# Patient Record
Sex: MALE | Race: WHITE | NOT HISPANIC OR LATINO | Employment: UNEMPLOYED | URBAN - METROPOLITAN AREA
[De-identification: names, ages, dates, MRNs, and addresses within clinical notes are randomized per-mention and may not be internally consistent; named-entity substitution may affect disease eponyms.]

---

## 2018-12-31 ENCOUNTER — TELEPHONE (OUTPATIENT)
Dept: SCHEDULING | Facility: IMAGING CENTER | Age: 63
End: 2018-12-31

## 2019-01-29 ENCOUNTER — TELEPHONE (OUTPATIENT)
Dept: SCHEDULING | Facility: IMAGING CENTER | Age: 64
End: 2019-01-29

## 2019-01-29 NOTE — PROGRESS NOTES
New Patient     Reason for visit: Acute Illness    CC: med refill    HPI: Patient is a pleasant 63-year-old man who comes in for medication refills.  His prior PCP is no longer in practice.  He is wanting to reestablish with an office that is closer to his home.  This doctor called in sick yesterday, which is why he made an acute visit appointment today for med refills.  He will be rescheduling with a new PCP as soon as possible.    GOUT  -Last gout attack was several years ago.  Has been taking allopurinol since then.  Has only had one attack.    Hypertension  -SBP at home ranges between 130-140  -Denies headaches, shortness of breath, chest pain/palpitations, lower extremity edema.    Hyperlipidemia  -Taking atorvastatin 80 mg.  Not on aspirin.    Social history: Quit smoking tobacco a few years ago.  Does continue to drink alcohol.    Patient Active Problem List    Diagnosis Date Noted   • Gout 01/30/2019   • Essential hypertension 01/30/2019   • Other hyperlipidemia 01/30/2019       No current outpatient prescriptions on file.     No current facility-administered medications for this visit.        Allergies as of 01/30/2019   • (Not on File)       Social History     Social History   • Marital status:      Spouse name: N/A   • Number of children: N/A   • Years of education: N/A     Occupational History   • Not on file.     Social History Main Topics   • Smoking status: Never Smoker   • Smokeless tobacco: Never Used   • Alcohol use 7.2 oz/week     12 Cans of beer per week   • Drug use: Yes     Types: Marijuana   • Sexual activity: Not on file     Other Topics Concern   • Not on file     Social History Narrative   • No narrative on file       History reviewed. No pertinent family history.    History reviewed. No pertinent surgical history.    ROS: As per HPI. Additional pertinent symptoms as noted below.  Review of Systems   Constitutional: Negative for chills, fever and malaise/fatigue.   HENT: Negative for  "hearing loss.    Eyes: Negative for blurred vision.   Respiratory: Negative for cough and shortness of breath.    Cardiovascular: Negative for chest pain, palpitations and leg swelling.   Gastrointestinal: Negative for abdominal pain and nausea.   Genitourinary: Negative for dysuria and frequency.   Musculoskeletal: Negative for joint pain and myalgias.   Skin: Negative for rash.   Neurological: Negative for dizziness, sensory change, focal weakness, weakness and headaches.   Psychiatric/Behavioral: Negative for depression. The patient is not nervous/anxious.      /84 (BP Location: Left arm, Patient Position: Sitting, BP Cuff Size: Adult)   Pulse 86   Temp 36.5 °C (97.7 °F) (Temporal)   Ht 1.721 m (5' 7.75\")   Wt 92.3 kg (203 lb 6.4 oz)   SpO2 94%   BMI 31.16 kg/m²      Physical Exam  General:  Alert and oriented, No apparent distress.  Hoarse voice.    Eyes: Pupils equal and reactive. No scleral icterus.    Throat: Clear no erythema or exudates noted.    Neck: Supple. No lymphadenopathy noted. Thyroid not enlarged.    Lungs: Clear to auscultation and percussion bilaterally.    Cardiovascular: Regular rate and rhythm. No murmurs, rubs or gallops.    Abdomen:  Benign. No rebound or guarding noted.    Extremities: No clubbing, cyanosis, edema.    Skin: Clear. No rash or suspicious skin lesions noted.  Scar on anterior neck.      Assessment and Plan    Gout  -Pending uric acid level  -Refilled allopurinol  -Follow-up with new PCP    Hypertension  -SBP at home 130-140s, in office 140  -On diltiazem and lisinopril  -Ordered CMP  -Refilled meds  -Follow-up with new PCP    Hyperlipidemia  -Refilled atorvastatin  -Pending lipid panel    Healthcare maintenance  Pending CBC, CMP, lipid panel, uric acid.  -Recommend follow-up with new PCP as soon as possible.  -Advised to avoid alcohol      Signed by: Merlyn Neely M.D.  "

## 2019-01-30 ENCOUNTER — OFFICE VISIT (OUTPATIENT)
Dept: INTERNAL MEDICINE | Facility: MEDICAL CENTER | Age: 64
End: 2019-01-30
Payer: COMMERCIAL

## 2019-01-30 VITALS
BODY MASS INDEX: 30.83 KG/M2 | HEIGHT: 68 IN | WEIGHT: 203.4 LBS | SYSTOLIC BLOOD PRESSURE: 140 MMHG | OXYGEN SATURATION: 94 % | HEART RATE: 86 BPM | DIASTOLIC BLOOD PRESSURE: 84 MMHG | TEMPERATURE: 97.7 F

## 2019-01-30 DIAGNOSIS — M10.9 GOUT OF FOOT, UNSPECIFIED CAUSE, UNSPECIFIED CHRONICITY, UNSPECIFIED LATERALITY: ICD-10-CM

## 2019-01-30 DIAGNOSIS — I10 ESSENTIAL HYPERTENSION: ICD-10-CM

## 2019-01-30 DIAGNOSIS — E78.49 OTHER HYPERLIPIDEMIA: ICD-10-CM

## 2019-01-30 PROCEDURE — 99204 OFFICE O/P NEW MOD 45 MIN: CPT | Mod: GC | Performed by: INTERNAL MEDICINE

## 2019-01-30 RX ORDER — DILTIAZEM HYDROCHLORIDE 180 MG/1
180 CAPSULE, COATED, EXTENDED RELEASE ORAL DAILY
Qty: 30 CAP | Refills: 0 | Status: SHIPPED | OUTPATIENT
Start: 2019-01-30 | End: 2019-02-26 | Stop reason: SDUPTHER

## 2019-01-30 RX ORDER — ATORVASTATIN CALCIUM 80 MG/1
80 TABLET, FILM COATED ORAL EVERY EVENING
Qty: 30 TAB | Refills: 0 | Status: SHIPPED | OUTPATIENT
Start: 2019-01-30 | End: 2019-02-26 | Stop reason: SDUPTHER

## 2019-01-30 RX ORDER — LISINOPRIL 40 MG/1
40 TABLET ORAL DAILY
Qty: 30 TAB | Refills: 0 | Status: SHIPPED | OUTPATIENT
Start: 2019-01-30 | End: 2019-02-26 | Stop reason: SDUPTHER

## 2019-01-30 RX ORDER — DILTIAZEM HYDROCHLORIDE 180 MG/1
180 CAPSULE, COATED, EXTENDED RELEASE ORAL DAILY
COMMUNITY
End: 2019-01-30 | Stop reason: SDUPTHER

## 2019-01-30 RX ORDER — ALLOPURINOL 300 MG/1
300 TABLET ORAL DAILY
Qty: 30 TAB | Refills: 0 | Status: SHIPPED | OUTPATIENT
Start: 2019-01-30 | End: 2019-02-26 | Stop reason: SDUPTHER

## 2019-01-30 RX ORDER — LISINOPRIL 40 MG/1
40 TABLET ORAL DAILY
COMMUNITY
Start: 2019-01-19 | End: 2019-01-30 | Stop reason: SDUPTHER

## 2019-01-30 RX ORDER — ATORVASTATIN CALCIUM 80 MG/1
80 TABLET, FILM COATED ORAL EVERY EVENING
COMMUNITY
Start: 2019-01-19 | End: 2019-01-30 | Stop reason: SDUPTHER

## 2019-01-30 RX ORDER — ALLOPURINOL 300 MG/1
300 TABLET ORAL DAILY
COMMUNITY
Start: 2019-01-19 | End: 2019-01-30 | Stop reason: SDUPTHER

## 2019-01-30 ASSESSMENT — ENCOUNTER SYMPTOMS
HEADACHES: 0
MYALGIAS: 0
CHILLS: 0
ABDOMINAL PAIN: 0
DIZZINESS: 0
SENSORY CHANGE: 0
COUGH: 0
NAUSEA: 0
NERVOUS/ANXIOUS: 0
SHORTNESS OF BREATH: 0
PALPITATIONS: 0
DEPRESSION: 0
FEVER: 0
WEAKNESS: 0
FOCAL WEAKNESS: 0
BLURRED VISION: 0

## 2019-02-06 ENCOUNTER — HOSPITAL ENCOUNTER (OUTPATIENT)
Dept: LAB | Facility: MEDICAL CENTER | Age: 64
End: 2019-02-06
Attending: STUDENT IN AN ORGANIZED HEALTH CARE EDUCATION/TRAINING PROGRAM
Payer: COMMERCIAL

## 2019-02-06 DIAGNOSIS — I10 ESSENTIAL HYPERTENSION: ICD-10-CM

## 2019-02-06 DIAGNOSIS — E78.49 OTHER HYPERLIPIDEMIA: ICD-10-CM

## 2019-02-06 DIAGNOSIS — M10.9 GOUT OF FOOT, UNSPECIFIED CAUSE, UNSPECIFIED CHRONICITY, UNSPECIFIED LATERALITY: ICD-10-CM

## 2019-02-06 LAB
BASOPHILS # BLD AUTO: 1.5 % (ref 0–1.8)
BASOPHILS # BLD: 0.13 K/UL (ref 0–0.12)
EOSINOPHIL # BLD AUTO: 0.24 K/UL (ref 0–0.51)
EOSINOPHIL NFR BLD: 2.8 % (ref 0–6.9)
ERYTHROCYTE [DISTWIDTH] IN BLOOD BY AUTOMATED COUNT: 44.3 FL (ref 35.9–50)
HCT VFR BLD AUTO: 46.4 % (ref 42–52)
HGB BLD-MCNC: 15.9 G/DL (ref 14–18)
IMM GRANULOCYTES # BLD AUTO: 0.04 K/UL (ref 0–0.11)
IMM GRANULOCYTES NFR BLD AUTO: 0.5 % (ref 0–0.9)
LYMPHOCYTES # BLD AUTO: 3.04 K/UL (ref 1–4.8)
LYMPHOCYTES NFR BLD: 35.1 % (ref 22–41)
MCH RBC QN AUTO: 32.6 PG (ref 27–33)
MCHC RBC AUTO-ENTMCNC: 34.3 G/DL (ref 33.7–35.3)
MCV RBC AUTO: 95.3 FL (ref 81.4–97.8)
MONOCYTES # BLD AUTO: 1.02 K/UL (ref 0–0.85)
MONOCYTES NFR BLD AUTO: 11.8 % (ref 0–13.4)
NEUTROPHILS # BLD AUTO: 4.18 K/UL (ref 1.82–7.42)
NEUTROPHILS NFR BLD: 48.3 % (ref 44–72)
NRBC # BLD AUTO: 0 K/UL
NRBC BLD-RTO: 0 /100 WBC
PLATELET # BLD AUTO: 196 K/UL (ref 164–446)
PMV BLD AUTO: 10.5 FL (ref 9–12.9)
RBC # BLD AUTO: 4.87 M/UL (ref 4.7–6.1)
WBC # BLD AUTO: 8.7 K/UL (ref 4.8–10.8)

## 2019-02-06 PROCEDURE — 85025 COMPLETE CBC W/AUTO DIFF WBC: CPT

## 2019-02-06 PROCEDURE — 36415 COLL VENOUS BLD VENIPUNCTURE: CPT

## 2019-02-13 ENCOUNTER — OFFICE VISIT (OUTPATIENT)
Dept: MEDICAL GROUP | Facility: PHYSICIAN GROUP | Age: 64
End: 2019-02-13
Payer: COMMERCIAL

## 2019-02-13 VITALS
HEIGHT: 68 IN | OXYGEN SATURATION: 94 % | TEMPERATURE: 98.5 F | DIASTOLIC BLOOD PRESSURE: 68 MMHG | WEIGHT: 204 LBS | BODY MASS INDEX: 30.92 KG/M2 | SYSTOLIC BLOOD PRESSURE: 140 MMHG | HEART RATE: 83 BPM

## 2019-02-13 DIAGNOSIS — Z00.00 HEALTHCARE MAINTENANCE: ICD-10-CM

## 2019-02-13 DIAGNOSIS — E78.49 OTHER HYPERLIPIDEMIA: ICD-10-CM

## 2019-02-13 DIAGNOSIS — M10.9 GOUT OF FOOT, UNSPECIFIED CAUSE, UNSPECIFIED CHRONICITY, UNSPECIFIED LATERALITY: ICD-10-CM

## 2019-02-13 DIAGNOSIS — Z87.19 HISTORY OF COLONIC DIVERTICULITIS: ICD-10-CM

## 2019-02-13 DIAGNOSIS — R73.03 PREDIABETES: ICD-10-CM

## 2019-02-13 DIAGNOSIS — Z13.228 ENCOUNTER FOR SCREENING FOR METABOLIC DISORDER: ICD-10-CM

## 2019-02-13 DIAGNOSIS — R49.0 HOARSENESS OF VOICE: ICD-10-CM

## 2019-02-13 DIAGNOSIS — I10 ESSENTIAL HYPERTENSION: ICD-10-CM

## 2019-02-13 PROCEDURE — 99214 OFFICE O/P EST MOD 30 MIN: CPT | Performed by: INTERNAL MEDICINE

## 2019-02-13 ASSESSMENT — PATIENT HEALTH QUESTIONNAIRE - PHQ9: CLINICAL INTERPRETATION OF PHQ2 SCORE: 0

## 2019-02-13 NOTE — ASSESSMENT & PLAN NOTE
This is a chronic health problem that is uncontrolled with current medications and lifestyle measures. B.P in the office today was 140/68. On Lisinopril 40 mg daily, Diltiazem 180 mg daily. Home B.P checks at home once a week, 132/81.

## 2019-02-13 NOTE — PROGRESS NOTES
CC: Establish care with new PCP, hypertension.    HISTORY OF PRESENT ILLNESS: Patient is a 63 y.o. male established patient who presents today to discuss her medical conditions as mentioned in HPI below.    Health Maintenance: Completed    Squamous cell carcinoma  Hx of Laryngeal SCC at [facility] in 2005. Since then patient has been visiting every 3 months for 5 yrs F/U and don't need it anymore.      Hoarseness of voice  Hx of Laryngeal SCC resection. No Chemo radio Rx and he is free.    Essential hypertension  This is a chronic health problem that is uncontrolled with current medications and lifestyle measures. B.P in the office today was 140/68. On Lisinopril 40 mg daily, Diltiazem 180 mg daily. Home B.P checks at home once a week, 132/81.    Other hyperlipidemia  This is a chronic health problem that is well controlled with current medications and lifestyle measures. On Atorvastatin 80 mg daily.    Gout  This is a chronic health problem that is well controlled with current medications and lifestyle measures. On ALlopurinol 300 mg daily.     Healthcare maintenance  Last Colonoscopy 5 yrs back OK for 10 yrs as per the patient.  Due for Tdap and Shingrix.    History of colonic diverticulitis  Had hospitalization 5 yrs back with bloody stools.       PHQ score 0, BMI within normal limits, no tobacco, no fall injuries.    Patient Active Problem List    Diagnosis Date Noted   • Squamous cell carcinoma 02/13/2019   • Hoarseness of voice 02/13/2019   • Healthcare maintenance 02/13/2019   • History of colonic diverticulitis 02/13/2019   • Gout 01/30/2019   • Essential hypertension 01/30/2019   • Other hyperlipidemia 01/30/2019      Allergies:Patient has no known allergies.    Current Outpatient Prescriptions   Medication Sig Dispense Refill   • allopurinol (ZYLOPRIM) 300 MG Tab Take 1 Tab by mouth every day. 30 Tab 0   • atorvastatin (LIPITOR) 80 MG tablet Take 1 Tab by mouth every evening. 30 Tab 0   • DILTIAZem CD  (CARDIZEM CD) 180 MG CAPSULE SR 24 HR Take 1 Cap by mouth every day. 30 Cap 0   • lisinopril (PRINIVIL, ZESTRIL) 40 MG tablet Take 1 Tab by mouth every day. 30 Tab 0     No current facility-administered medications for this visit.        Social History   Substance Use Topics   • Smoking status: Never Smoker   • Smokeless tobacco: Never Used   • Alcohol use 7.2 oz/week     12 Cans of beer per week     Social History     Social History Narrative   • No narrative on file       Family History   Problem Relation Age of Onset   • Diabetes Mother    • No Known Problems Father         ROS:     - Constitutional:  Negative for fever, chills, unexpected weight change, and fatigue/generalized weakness.    - HEENT:  Negative for headaches, vision changes, hearing changes, ear pain, ear discharge, rhinorrhea, sinus congestion, sore throat, and neck pain.      - Respiratory: Negative for cough, sputum production, chest congestion, dyspnea, wheezing, and crackles.      - Cardiovascular: Negative for chest pain, palpitations, orthopnea, and bilateral lower extremity edema.     - Gastrointestinal: Negative for heartburn, nausea, vomiting, abdominal pain, hematochezia, melena, diarrhea, constipation, and greasy/foul-smelling stools.     - Genitourinary: Negative for dysuria, polyuria, hematuria, pyuria, urinary urgency, and urinary incontinence.     - Musculoskeletal: Negative for myalgias, back pain, and joint pain.     - Skin: Negative for rash, itching, cyanotic skin color change.     - Neurological: Negative for dizziness, tingling, tremors, focal sensory deficit, focal weakness and headaches.     - Endo/Heme/Allergies: Does not bruise/bleed easily.     - Psychiatric/Behavioral: Negative for depression, suicidal/homicidal ideation and memory loss.        Last lab work was only CBC done recently, will do all the lab work needed.    Exam:    Blood pressure 140/68, pulse 83, temperature 36.9 °C (98.5 °F), temperature source Temporal,  "height 1.727 m (5' 8\"), weight 92.5 kg (204 lb), SpO2 94 %. Body mass index is 31.02 kg/m².    General:  Well nourished, well developed male in NAD  Head is grossly normal.  Neck: Supple without JVD or bruit. Thyroid is not enlarged.  Pulmonary: Clear to ausculation and percussion.  Normal effort. No rales, ronchi, or wheezing.  Cardiovascular: Regular rate and rhythm without murmur. Carotid and radial pulses are intact and equal bilaterally.  Extremities: no clubbing, cyanosis, or edema.    Please note that this dictation was created using voice recognition software. I have made every reasonable attempt to correct obvious errors, but I expect that there are errors of grammar and possibly content that I did not discover before finalizing the note.    Assessment/Plan:  1. Squamous cell carcinoma  2. Hoarseness of voice  Stable, will get the records from [facility] where he had underwent the treatment.  Does have continuing hoarseness of voice.  Patient expresses that he is traveling, at this time and requesting for a Z-Elmer for prophylactic usage as he gets attacks of bronchitis with the current medical conditions.    2. Essential hypertension  Borderline elevated, continue current medications lisinopril 40 mg daily, diltiazem 180 mg daily.  Given instructions to check BP log and also DASH diet in the after visit summary today.  Patient wants to come down on the medication dosages and slowly stop them but explained all the risks of uncontrolled blood pressure at this time.  - Comp Metabolic Panel; Future      4. Other hyperlipidemia  No recent lipid panel visible in epic, currently on atorvastatin 80 mg nightly.  Will check his lipid panel and adjust the dose if needed.  - Lipid Profile; Future    5. Gout of foot, unspecified cause, unspecified chronicity, unspecified laterality  Stable, no recent flareup of gout.  Continue current allopurinol 300 mg daily.    6. Prediabetes  Well-controlled, patient has been previously " using metformin in the past given the family history of diabetes in mother.  Requesting for a check of HbA1c at this time.  As he stopped by self managing on the metformin.  - HEMOGLOBIN A1C; Future    9. Encounter for screening for metabolic disorder  Will check a vitamin D level and replete if needed given his age.  - VITAMIN D,25 HYDROXY; Future

## 2019-02-13 NOTE — ASSESSMENT & PLAN NOTE
Hx of Laryngeal SCC at [facility] in 2005. Since then patient has been visiting every 3 months for 5 yrs F/U and don't need it anymore.

## 2019-02-13 NOTE — PATIENT INSTRUCTIONS
"Dietary Approaches to Stop Hypertension trial -- A different approach was evaluated in the Dietary Approaches to Stop Hypertension (DASH) trial [6]. Rather than evaluating sodium intake or weight loss, DASH randomly assigned 459 patients with BPs of less than 160/80 to 95 mmHg to one of three diets:  ?A control diet low in fruits, vegetables, and legumes and high in snacks, sweets, meats, and saturated fat.  ?A diet rich in fruits, vegetables, legumes and low in snacks and sweets.  ?A combination diet rich in fruits, vegetables, legumes, and low-fat dairy products and low in snacks, sweets, meats, and saturated and total fat (this combination diet is called the \"DASH diet\"). The DASH diet is comprised of four to five servings of fruit, four to five servings of vegetables, two to three servings of low-fat dairy per day, and <25 percent fat.  The following observations were noted in which the BP reductions were expressed in relation to the fall in BP seen with the control diet:  ?The fruits and vegetables diet reduced the BP by 2.8/1.1 mmHg, and the combination diet reduced the BP by 5.5/3.0.  ?These effects were more pronounced in patients with hypertension. With the combination diet, for example, the BP fell 11.4/5.5 mmHg in hypertensives versus 3.5/2.1 mmHg in the normotensives.  ?The antihypertensive effects were maximal by the end of week 2 with any of the diets and were then maintained for eight weeks.      "

## 2019-02-13 NOTE — ASSESSMENT & PLAN NOTE
This is a chronic health problem that is well controlled with current medications and lifestyle measures. On ALlopurinol 300 mg daily.

## 2019-02-13 NOTE — ASSESSMENT & PLAN NOTE
This is a chronic health problem that is well controlled with current medications and lifestyle measures. On Atorvastatin 80 mg daily.

## 2019-02-14 ENCOUNTER — HOSPITAL ENCOUNTER (OUTPATIENT)
Dept: LAB | Facility: MEDICAL CENTER | Age: 64
End: 2019-02-14
Attending: INTERNAL MEDICINE
Payer: COMMERCIAL

## 2019-02-14 DIAGNOSIS — I10 ESSENTIAL HYPERTENSION: ICD-10-CM

## 2019-02-14 DIAGNOSIS — E78.49 OTHER HYPERLIPIDEMIA: ICD-10-CM

## 2019-02-14 DIAGNOSIS — Z13.228 ENCOUNTER FOR SCREENING FOR METABOLIC DISORDER: ICD-10-CM

## 2019-02-14 DIAGNOSIS — R73.03 PREDIABETES: ICD-10-CM

## 2019-02-14 LAB
25(OH)D3 SERPL-MCNC: 39 NG/ML (ref 30–100)
ALBUMIN SERPL BCP-MCNC: 4.6 G/DL (ref 3.2–4.9)
ALBUMIN/GLOB SERPL: 1.4 G/DL
ALP SERPL-CCNC: 63 U/L (ref 30–99)
ALT SERPL-CCNC: 33 U/L (ref 2–50)
ANION GAP SERPL CALC-SCNC: 5 MMOL/L (ref 0–11.9)
AST SERPL-CCNC: 24 U/L (ref 12–45)
BILIRUB SERPL-MCNC: 0.7 MG/DL (ref 0.1–1.5)
BUN SERPL-MCNC: 17 MG/DL (ref 8–22)
CALCIUM SERPL-MCNC: 9.9 MG/DL (ref 8.5–10.5)
CHLORIDE SERPL-SCNC: 107 MMOL/L (ref 96–112)
CHOLEST SERPL-MCNC: 161 MG/DL (ref 100–199)
CO2 SERPL-SCNC: 25 MMOL/L (ref 20–33)
CREAT SERPL-MCNC: 0.76 MG/DL (ref 0.5–1.4)
EST. AVERAGE GLUCOSE BLD GHB EST-MCNC: 117 MG/DL
FASTING STATUS PATIENT QL REPORTED: NORMAL
GLOBULIN SER CALC-MCNC: 3.3 G/DL (ref 1.9–3.5)
GLUCOSE SERPL-MCNC: 127 MG/DL (ref 65–99)
HBA1C MFR BLD: 5.7 % (ref 0–5.6)
HDLC SERPL-MCNC: 49 MG/DL
LDLC SERPL CALC-MCNC: 93 MG/DL
POTASSIUM SERPL-SCNC: 4.1 MMOL/L (ref 3.6–5.5)
PROT SERPL-MCNC: 7.9 G/DL (ref 6–8.2)
SODIUM SERPL-SCNC: 137 MMOL/L (ref 135–145)
TRIGL SERPL-MCNC: 93 MG/DL (ref 0–149)

## 2019-02-14 PROCEDURE — 36415 COLL VENOUS BLD VENIPUNCTURE: CPT

## 2019-02-14 PROCEDURE — 82306 VITAMIN D 25 HYDROXY: CPT

## 2019-02-14 PROCEDURE — 83036 HEMOGLOBIN GLYCOSYLATED A1C: CPT

## 2019-02-14 PROCEDURE — 80061 LIPID PANEL: CPT

## 2019-02-14 PROCEDURE — 80053 COMPREHEN METABOLIC PANEL: CPT

## 2019-02-26 ENCOUNTER — TELEPHONE (OUTPATIENT)
Dept: MEDICAL GROUP | Facility: PHYSICIAN GROUP | Age: 64
End: 2019-02-26

## 2019-02-26 DIAGNOSIS — E78.49 OTHER HYPERLIPIDEMIA: ICD-10-CM

## 2019-02-26 DIAGNOSIS — M10.9 GOUT OF FOOT, UNSPECIFIED CAUSE, UNSPECIFIED CHRONICITY, UNSPECIFIED LATERALITY: ICD-10-CM

## 2019-02-26 DIAGNOSIS — J40 BRONCHITIS: ICD-10-CM

## 2019-02-26 DIAGNOSIS — I10 ESSENTIAL HYPERTENSION: ICD-10-CM

## 2019-02-26 RX ORDER — LISINOPRIL 40 MG/1
40 TABLET ORAL DAILY
Qty: 30 TAB | Refills: 0 | Status: SHIPPED | OUTPATIENT
Start: 2019-02-26 | End: 2019-02-27 | Stop reason: SDUPTHER

## 2019-02-26 RX ORDER — ALLOPURINOL 300 MG/1
300 TABLET ORAL DAILY
Qty: 30 TAB | Refills: 0 | Status: SHIPPED | OUTPATIENT
Start: 2019-02-26 | End: 2019-02-27 | Stop reason: SDUPTHER

## 2019-02-26 RX ORDER — DILTIAZEM HYDROCHLORIDE 180 MG/1
180 CAPSULE, COATED, EXTENDED RELEASE ORAL DAILY
Qty: 30 CAP | Refills: 0 | Status: SHIPPED | OUTPATIENT
Start: 2019-02-26 | End: 2019-02-27 | Stop reason: SDUPTHER

## 2019-02-26 RX ORDER — ATORVASTATIN CALCIUM 80 MG/1
80 TABLET, FILM COATED ORAL EVERY EVENING
Qty: 30 TAB | Refills: 0 | Status: SHIPPED | OUTPATIENT
Start: 2019-02-26 | End: 2019-02-27 | Stop reason: SDUPTHER

## 2019-02-26 RX ORDER — AZITHROMYCIN 250 MG/1
TABLET, FILM COATED ORAL
Qty: 6 TAB | Refills: 0 | Status: SHIPPED | OUTPATIENT
Start: 2019-02-26 | End: 2019-05-07

## 2019-02-26 NOTE — TELEPHONE ENCOUNTER
Patient was in here 2/13 and was expecting refills on all of his medications as well as a z pack for bronchitis, but it looks like none of the medications were sent to the pharamacy. Please advise.

## 2019-02-26 NOTE — TELEPHONE ENCOUNTER
1. Caller Name: Bert                                         Call Back Number: [phone] (home)       Patient approves a detailed voicemail message: N\A  Pt called and is requesting a Z-Pack rx. It was discussed at most recent OV but it was not sent. Please advise.

## 2019-02-27 DIAGNOSIS — M10.9 GOUT OF FOOT, UNSPECIFIED CAUSE, UNSPECIFIED CHRONICITY, UNSPECIFIED LATERALITY: ICD-10-CM

## 2019-02-27 DIAGNOSIS — I10 ESSENTIAL HYPERTENSION: ICD-10-CM

## 2019-02-27 DIAGNOSIS — E78.49 OTHER HYPERLIPIDEMIA: ICD-10-CM

## 2019-02-27 RX ORDER — LISINOPRIL 40 MG/1
40 TABLET ORAL DAILY
Qty: 30 TAB | Refills: 0 | Status: SHIPPED | OUTPATIENT
Start: 2019-02-27 | End: 2019-04-05 | Stop reason: SDUPTHER

## 2019-02-27 RX ORDER — DILTIAZEM HYDROCHLORIDE 180 MG/1
180 CAPSULE, COATED, EXTENDED RELEASE ORAL DAILY
Qty: 30 CAP | Refills: 0 | Status: SHIPPED | OUTPATIENT
Start: 2019-02-27 | End: 2019-04-27 | Stop reason: SDUPTHER

## 2019-02-27 RX ORDER — ATORVASTATIN CALCIUM 80 MG/1
80 TABLET, FILM COATED ORAL EVERY EVENING
Qty: 30 TAB | Refills: 0 | Status: SHIPPED | OUTPATIENT
Start: 2019-02-27 | End: 2019-04-05 | Stop reason: SDUPTHER

## 2019-02-27 RX ORDER — ALLOPURINOL 300 MG/1
300 TABLET ORAL DAILY
Qty: 30 TAB | Refills: 0 | Status: SHIPPED | OUTPATIENT
Start: 2019-02-27 | End: 2019-05-31

## 2019-04-05 DIAGNOSIS — E78.49 OTHER HYPERLIPIDEMIA: ICD-10-CM

## 2019-04-05 DIAGNOSIS — I10 ESSENTIAL HYPERTENSION: ICD-10-CM

## 2019-04-05 NOTE — TELEPHONE ENCOUNTER
*PER NEW INS PROTOCOL, NEEDS TO BE DONE FOR 100DAYS SUPPLY*  Was the patient seen in the last year in this department? Yes    Does patient have an active prescription for medications requested? No     Received Request Via: Pharmacy      Pt met protocol?: Yes  LAST OV 02/13/2019    BP Readings from Last 1 Encounters:   02/13/19 140/68     Lab Results   Component Value Date/Time    CHOLSTRLTOT 161 02/14/2019 08:44 AM    LDL 93 02/14/2019 08:44 AM    HDL 49 02/14/2019 08:44 AM    TRIGLYCERIDE 93 02/14/2019 08:44 AM       Lab Results   Component Value Date/Time    SODIUM 137 02/14/2019 08:44 AM    POTASSIUM 4.1 02/14/2019 08:44 AM    CHLORIDE 107 02/14/2019 08:44 AM    CO2 25 02/14/2019 08:44 AM    GLUCOSE 127 (H) 02/14/2019 08:44 AM    BUN 17 02/14/2019 08:44 AM    CREATININE 0.76 02/14/2019 08:44 AM     Lab Results   Component Value Date/Time    ALKPHOSPHAT 63 02/14/2019 08:44 AM    ASTSGOT 24 02/14/2019 08:44 AM    ALTSGPT 33 02/14/2019 08:44 AM    TBILIRUBIN 0.7 02/14/2019 08:44 AM      Lab Results   Component Value Date/Time    CHOLSTRLTOT 161 02/14/2019 08:44 AM    LDL 93 02/14/2019 08:44 AM    HDL 49 02/14/2019 08:44 AM    TRIGLYCERIDE 93 02/14/2019 08:44 AM       Lab Results   Component Value Date/Time    SODIUM 137 02/14/2019 08:44 AM    POTASSIUM 4.1 02/14/2019 08:44 AM    CHLORIDE 107 02/14/2019 08:44 AM    CO2 25 02/14/2019 08:44 AM    GLUCOSE 127 (H) 02/14/2019 08:44 AM    BUN 17 02/14/2019 08:44 AM    CREATININE 0.76 02/14/2019 08:44 AM     Lab Results   Component Value Date/Time    ALKPHOSPHAT 63 02/14/2019 08:44 AM    ASTSGOT 24 02/14/2019 08:44 AM    ALTSGPT 33 02/14/2019 08:44 AM    TBILIRUBIN 0.7 02/14/2019 08:44 AM

## 2019-04-09 RX ORDER — ATORVASTATIN CALCIUM 80 MG/1
80 TABLET, FILM COATED ORAL EVERY EVENING
Qty: 100 TAB | Refills: 3 | Status: SHIPPED
Start: 2019-04-09 | End: 2020-01-06

## 2019-04-09 RX ORDER — LISINOPRIL 40 MG/1
40 TABLET ORAL DAILY
Qty: 100 TAB | Refills: 1 | Status: SHIPPED | OUTPATIENT
Start: 2019-04-09 | End: 2019-11-05

## 2019-04-09 NOTE — TELEPHONE ENCOUNTER
Pt has had OV within the 12 month protocol and lipid panel is current. 6 month supply sent to pharmacy for BP meds and 12 months of chol.   Lab Results   Component Value Date/Time    CHOLSTRLTOT 161 02/14/2019 08:44 AM    LDL 93 02/14/2019 08:44 AM    HDL 49 02/14/2019 08:44 AM    TRIGLYCERIDE 93 02/14/2019 08:44 AM       Lab Results   Component Value Date/Time    SODIUM 137 02/14/2019 08:44 AM    POTASSIUM 4.1 02/14/2019 08:44 AM    CHLORIDE 107 02/14/2019 08:44 AM    CO2 25 02/14/2019 08:44 AM    GLUCOSE 127 (H) 02/14/2019 08:44 AM    BUN 17 02/14/2019 08:44 AM    CREATININE 0.76 02/14/2019 08:44 AM     Lab Results   Component Value Date/Time    ALKPHOSPHAT 63 02/14/2019 08:44 AM    ASTSGOT 24 02/14/2019 08:44 AM    ALTSGPT 33 02/14/2019 08:44 AM    TBILIRUBIN 0.7 02/14/2019 08:44 AM

## 2019-04-18 ENCOUNTER — PATIENT OUTREACH (OUTPATIENT)
Dept: HEALTH INFORMATION MANAGEMENT | Facility: OTHER | Age: 64
End: 2019-04-18

## 2019-04-27 DIAGNOSIS — I10 ESSENTIAL HYPERTENSION: ICD-10-CM

## 2019-04-29 RX ORDER — DILTIAZEM HYDROCHLORIDE 180 MG/1
CAPSULE, EXTENDED RELEASE ORAL
Qty: 90 CAP | Refills: 0 | Status: SHIPPED | OUTPATIENT
Start: 2019-04-29 | End: 2019-08-04 | Stop reason: SDUPTHER

## 2019-05-06 ENCOUNTER — TELEPHONE (OUTPATIENT)
Dept: MEDICAL GROUP | Facility: PHYSICIAN GROUP | Age: 64
End: 2019-05-06

## 2019-05-06 NOTE — TELEPHONE ENCOUNTER
Future Appointments       Provider Department Center    5/7/2019 10:20 AM Meliza Massey M.D. [facility]        ESTABLISHED PATIENT PRE-VISIT PLANNING     Patient was NOT contacted to complete PVP.     Note: Patient will not be contacted if there is no indication to call.     1.  Reviewed notes from the last few office visits within the medical group: Yes    2.  If any orders were placed at last visit or intended to be done for this visit (i.e. 6 mos follow-up), do we have Results/Consult Notes?        •  Labs - Labs ordered, completed on 02/14/2019 and results are in chart.   Note: If patient appointment is for lab review and patient did not complete labs, check with provider if OK to reschedule patient until labs completed.       •  Imaging - Imaging was not ordered at last office visit.       •  Referrals - No referrals were ordered at last office visit.    3. Is this appointment scheduled as a Hospital Follow-Up? No    4.  Immunizations were updated in [facility] using WebIZ?: Yes       •  Web Iz Recommendations: PREVNAR (PCV13) , TDAP, VARICELLA (Chicken Pox)  and SHINGRIX (Shingles)    5.  Patient is due for the following Health Maintenance Topics:   Health Maintenance Due   Topic Date Due   • IMM DTaP/Tdap/Td Vaccine (1 - Tdap) 04/06/1973   • COLONOSCOPY  04/17/2009   • IMM ZOSTER VACCINES (2 of 3) 11/24/2016   • IMM PNEUMOCOCCAL  (1 of 2 - PCV13) 04/06/2019       6. Orders for overdue Health Maintenance topics pended in Pre-Charting? YES    7.  AHA (MDX) form printed for Provider? YES    8.  Patient was informed to arrive 15 min prior to their scheduled appointment and bring in their medication bottles. confirmed by

## 2019-05-07 ENCOUNTER — OFFICE VISIT (OUTPATIENT)
Dept: MEDICAL GROUP | Facility: PHYSICIAN GROUP | Age: 64
End: 2019-05-07
Payer: MEDICARE

## 2019-05-07 VITALS
DIASTOLIC BLOOD PRESSURE: 56 MMHG | BODY MASS INDEX: 30.77 KG/M2 | HEIGHT: 68 IN | HEART RATE: 84 BPM | TEMPERATURE: 98.9 F | OXYGEN SATURATION: 95 % | WEIGHT: 203 LBS | SYSTOLIC BLOOD PRESSURE: 118 MMHG

## 2019-05-07 DIAGNOSIS — E66.9 OBESITY (BMI 30-39.9): ICD-10-CM

## 2019-05-07 DIAGNOSIS — Z23 NEED FOR VACCINATION: ICD-10-CM

## 2019-05-07 DIAGNOSIS — F10.20 CHRONIC ALCOHOLISM (HCC): ICD-10-CM

## 2019-05-07 DIAGNOSIS — E78.49 OTHER HYPERLIPIDEMIA: ICD-10-CM

## 2019-05-07 DIAGNOSIS — Z12.11 SCREENING FOR COLON CANCER: ICD-10-CM

## 2019-05-07 DIAGNOSIS — E66.9 CLASS 1 OBESITY WITH BODY MASS INDEX (BMI) OF 30.0 TO 30.9 IN ADULT, UNSPECIFIED OBESITY TYPE, UNSPECIFIED WHETHER SERIOUS COMORBIDITY PRESENT: ICD-10-CM

## 2019-05-07 DIAGNOSIS — M10.9 GOUT OF FOOT, UNSPECIFIED CAUSE, UNSPECIFIED CHRONICITY, UNSPECIFIED LATERALITY: ICD-10-CM

## 2019-05-07 DIAGNOSIS — Z71.84 ENCOUNTER FOR COUNSELING FOR TRAVEL: ICD-10-CM

## 2019-05-07 DIAGNOSIS — I10 ESSENTIAL HYPERTENSION: ICD-10-CM

## 2019-05-07 PROCEDURE — 99214 OFFICE O/P EST MOD 30 MIN: CPT | Mod: 25 | Performed by: INTERNAL MEDICINE

## 2019-05-07 PROCEDURE — 8041 PR SCP AHA: Performed by: INTERNAL MEDICINE

## 2019-05-07 PROCEDURE — G0009 ADMIN PNEUMOCOCCAL VACCINE: HCPCS | Performed by: INTERNAL MEDICINE

## 2019-05-07 PROCEDURE — 90715 TDAP VACCINE 7 YRS/> IM: CPT | Performed by: INTERNAL MEDICINE

## 2019-05-07 PROCEDURE — 90472 IMMUNIZATION ADMIN EACH ADD: CPT | Performed by: INTERNAL MEDICINE

## 2019-05-07 PROCEDURE — 90670 PCV13 VACCINE IM: CPT | Performed by: INTERNAL MEDICINE

## 2019-05-07 NOTE — ASSESSMENT & PLAN NOTE
This is a chronic health problem that is well controlled with current medications and lifestyle measures.  Currently on allopurinol 300 mg daily.

## 2019-05-07 NOTE — ASSESSMENT & PLAN NOTE
This is a chronic health problem that is well controlled with current medications and lifestyle measures.  Last lipid panel in February 2019 within normal limits, currently on atorvastatin 80 mg nightly.

## 2019-05-07 NOTE — ASSESSMENT & PLAN NOTE
This is a chronic health problem that is well controlled with current medications and lifestyle measures.  Blood pressure in office today is 1 one 8/56, currently on lisinopril 40 mg daily, diltiazem 180 mg daily.

## 2019-05-07 NOTE — PATIENT INSTRUCTIONS
Today, your Healthcare Provider may have discussed the following recommendations:    1. Exercise and Physical Activity  According to the American Heart Association, it is recommended to engage in physical activity regularly and to aim for 150 minutes of moderate-intensity aerobic activity per week.  Your Healthcare Provider may have recommended taking the stairs instead of the elevator, starting or maintaining a walking program or strength-training program.    2. Emotional Well-being  Mental and emotional well-being is essential to overall health.  Your Healthcare Provider may have encouraged you to build strong, positive relationships with family and friends, become more involved in your community (by volunteering or joining a spiritual community), or focus on self-care.    3. Fall and Injury Prevention  To prevent falls and injuries and also improve your balance, your Healthcare Provider may have suggested that you use a cane or walker, start an exercise of physical therapy program, or have your vision and/or hearing tested.    4. Urinary Leakage (Urinary Incontinence)  To control or manage the leakage of urine, your Healthcare Provider may have recommended you start bladder training exercises (such as Kegel exercises), a trial of a medication or a referral to see a specialist to discuss surgical options.    ==========================    American Diabetes Association (ADA) nutritional guidelines --   1. A diet that includes carbohydrates from fruits, vegetables, whole grains, legumes, and low-fat milk is encouraged.The use of lower glycemic index and glycemic load meals may provide a modest additional benefit for glycemic control.  Low-Glycemic Foods  1. Sweet potatoes  2. Many vegetables, including leafy greens, asparagus, cauliflower  3. Steel-cut oatmeal  4. Farrow  5. Quinoa  6. Legumes, including lentils, chickpeas  7. Connor bread  8. Skim milk  9. Reduced-fat yogurt  10. Sesame seeds, peanuts, flax  seeds  2. A variety of eating patterns (Mediterranean, low fat, low carbohydrate, vegetarian) are acceptable.  3. Fat quality is more important than fat quantity-  with monounsaturated and polyunsaturated fatty acids (eg, those found in fish, olive oil, nuts).   4. Protein intake goals should be individualized but not lower than 0.8 g/kg body weight per day (the recommended daily allowance). Patients should be encouraged to substitute lean meats, fish, eggs, beans, peas, soy products, and nuts and seeds for red meat.  5. Fiber intake should be at least 14 grams per 1000 calories daily; higher fiber intake may improve glycemic control.  6. A reduced sodium intake of 2300 mg per day with a diet high in fruits, vegetables, and low-fat dairy products is prudent. For individuals with hypertension, further reduction in sodium may be necessary.  7. Foods containing sucrose to be avoided.  Physical Activity - 30 minutes or more of moderate-intensity physical activity on most days of the week .    Meliza Massey M.D.

## 2019-05-07 NOTE — PROGRESS NOTES
Subjective:     Bert Villalpando is a 64 y.o. male here today for follow-up visit, hypertension and Annual Health Assessment.    Essential hypertension  This is a chronic health problem that is well controlled with current medications and lifestyle measures.  Blood pressure in office today is 1 one 8/56, currently on lisinopril 40 mg daily, diltiazem 180 mg daily.    Gout  This is a chronic health problem that is well controlled with current medications and lifestyle measures.  Currently on allopurinol 300 mg daily.    Other hyperlipidemia  This is a chronic health problem that is well controlled with current medications and lifestyle measures.  Last lipid panel in February 2019 within normal limits, currently on atorvastatin 80 mg nightly.    Chronic alcoholism (HCC)  This is a chronic health problem that is uncontrolled with current medications and lifestyle measures. Pt currently on 2 cans / beer everyday.    PHQ score 0, BMI within normal limits, no tobacco, no fall injuries    Health Maintenance Summary                IMM DTaP/Tdap/Td Vaccine Overdue 4/6/1973     COLONOSCOPY Overdue 4/17/2009      Done 4/17/2006 REFERRAL TO GI FOR COLONOSCOPY    IMM ZOSTER VACCINES Overdue 11/24/2016      Done 9/29/2016 Imm Admin: Zoster Vaccine Live (ZVL) (Zostavax)    IMM PNEUMOCOCCAL 65+ (ADULT) LOW/MEDIUM RISK SERIES Overdue 4/6/2019      Done 1/7/2014 Imm Admin: Pneumococcal polysaccharide vaccine (PPSV-23)           Annual Health Assessment Questions:     1.  Are you currently engaging in any exercise or physical activity? Yes    2.  How would you describe your mood or emotional well-being today? good    3.  Have you had any falls in the last year? No    4.  Have you noticed any problems with your balance or had difficulty walking? No    5.  In the last six months have you experienced any leakage of urine? No    6. DPA/Advanced Directive: Patient has Advanced Directive on file.     Current medicines (including changes  "today)  Current Outpatient Prescriptions   Medication Sig Dispense Refill   • CARTIA  MG CAPSULE SR 24 HR TAKE ONE CAPSULE BY MOUTH DAILY 90 Cap 0   • lisinopril (PRINIVIL, ZESTRIL) 40 MG tablet Take 1 Tab by mouth every day. 100 Tab 1   • atorvastatin (LIPITOR) 80 MG tablet Take 1 Tab by mouth every evening. 100 Tab 3   • allopurinol (ZYLOPRIM) 300 MG Tab Take 1 Tab by mouth every day. 30 Tab 0     No current facility-administered medications for this visit.        He  has a past medical history of Squamous cell cancer of hypopharynx (HCC).    Patient has no known allergies.    He  reports that he has never smoked. He has never used smokeless tobacco. He reports that he drinks about 7.2 oz of alcohol per week . He reports that he uses drugs, including Marijuana.  Counseling given: Not Answered    Constitutional: Denies fevers or chills  Eyes: Denies changes in vision  Ears/Nose/Throat/Mouth: Denies nasal congestion or sore throat   Cardiovascular: Denies chest pain or palpitations   Respiratory: Denies shortness of breath , Denies cough  Gastrointestinal/Hepatic: Denies abd pain, nausea, vomiting   Genitourinary: Denies dysuria or frequency  Musculoskeletal/Rheum: Denies joint pain and swelling   Neurological: Denies headache  Psychiatric: Denies mood disorder   Endocrine: Denies hx of diabetes or thyroid dysfunction  Heme/Oncology/Lymph Nodes: Denies weight changes or enlarged LNs.   Allergic/Immunologic: Denies hx of allergies      Objective:     Physical Exam:  /56 (BP Location: Right arm, Patient Position: Sitting, BP Cuff Size: Large adult)   Pulse 84   Temp 37.2 °C (98.9 °F) (Temporal)   Ht 1.727 m (5' 8\")   Wt 92.1 kg (203 lb)   SpO2 95%  Body mass index is 30.87 kg/m².   Constitutional: Alert, no distress.  Skin: Warm, dry, good turgor, no rashes in visible areas.  Eye: Equal, round and reactive, conjunctiva clear, lids normal.  ENMT: Lips without lesions, good dentition, oropharynx " clear.  Neck: Trachea midline, no masses, no thyromegaly. No cervical or supraclavicular lymphadenopathy.  Respiratory: Unlabored respiratory effort, lungs clear to auscultation, no wheezes, no rhonchi.  Cardiovascular: Normal S1, S2, no murmur, no edema.  Abdomen: Soft, non-tender, no masses, no hepatosplenomegaly.  Psych: Alert and oriented x3, normal affect and mood.    Assessment and Plan:     1. Essential hypertension  Well-controlled, continue current medications lisinopril 40 mg daily, diltiazem 180 mg daily.    2. Chronic alcoholism (HCC)  Uncontrolled, patient thinks that he is taking only little amount of alcohol.  Understands all the risks.  Discussed at length on all the complications of alcoholism including ataxic gait and fall injuries which he refuses at this time.    3. Need for vaccination  - Tdap Vaccine =>6YO IM  - Pneumococcal Conjugate Vaccine 13-Valent    4. Screening for colon cancer  - REFERRAL TO GI FOR COLONOSCOPY    5. Gout of foot, unspecified cause, unspecified chronicity, unspecified laterality  Well-controlled, continue current allopurinol 300 mg daily.    6. Other hyperlipidemia  Stable, continue current atorvastatin 80 mg nightly.    7. Encounter for counseling for travel  Patient planning for traveling to [place], next month and also upcoming cruise.  Will give referral to clinic for possible need of coverage of vaccinations which he needs.  - REFERRAL TO TRAVEL CLINIC    8. Obesity (BMI 30-39.9)  9. Class 1 obesity with body mass index (BMI) of 30.0 to 30.9 in adult, unspecified obesity type, unspecified whether serious comorbidity present  Pt educated on the increase of morbidity and mortality associated with excess weight including DM, Heart Disease, HTN, stroke, and sleep apnea.  Pt advised weight loss of 5% through reduced calorie, low fat diet and 150 mins of exercise a week  Recommend obesity clinic if patient is unsuccessful with weight loss  - Patient identified as  having weight management issue.  Appropriate orders and counseling given.    Discussion today about general wellness and lifestyle habits:    · Engage in regular physical activity and social activities.  · Prevent falls and reduce trip hazards; using ambulatory aides, hearing and vision testing if appropriate.  · Steps to improve urinary incontinence.  · Advanced care planning.    Follow-Up: Return in about 4 months (around 9/7/2019), or if symptoms worsen or fail to improve.         PLEASE NOTE: This dictation was created using voice recognition software. I have made every reasonable attempt to correct obvious errors, but I expect that there are errors of grammar and possibly content that I did not discover before finalizing the note.

## 2019-05-07 NOTE — ASSESSMENT & PLAN NOTE
This is a chronic health problem that is uncontrolled with current medications and lifestyle measures. Pt currently on 2 cans / beer everyday.

## 2019-05-30 DIAGNOSIS — M10.9 GOUT OF FOOT, UNSPECIFIED CAUSE, UNSPECIFIED CHRONICITY, UNSPECIFIED LATERALITY: ICD-10-CM

## 2019-05-30 NOTE — TELEPHONE ENCOUNTER
Was the patient seen in the last year in this department? Yes    Does patient have an active prescription for medications requested? No     Received Request Via: Pharmacy      Pt met protocol?: Yes   Pt last ov 5/2019    Lab Results   Component Value Date/Time    SODIUM 137 02/14/2019 08:44 AM    POTASSIUM 4.1 02/14/2019 08:44 AM    CHLORIDE 107 02/14/2019 08:44 AM    CO2 25 02/14/2019 08:44 AM    GLUCOSE 127 (H) 02/14/2019 08:44 AM    BUN 17 02/14/2019 08:44 AM    CREATININE 0.76 02/14/2019 08:44 AM

## 2019-05-31 RX ORDER — ALLOPURINOL 300 MG/1
TABLET ORAL
Qty: 90 TAB | Refills: 0 | Status: SHIPPED | OUTPATIENT
Start: 2019-05-31 | End: 2019-09-12 | Stop reason: SDUPTHER

## 2019-08-04 DIAGNOSIS — I10 ESSENTIAL HYPERTENSION: ICD-10-CM

## 2019-08-05 RX ORDER — DILTIAZEM HYDROCHLORIDE 180 MG/1
CAPSULE, EXTENDED RELEASE ORAL
Qty: 90 CAP | Refills: 1 | Status: SHIPPED | OUTPATIENT
Start: 2019-08-05 | End: 2020-02-11 | Stop reason: SDUPTHER

## 2019-08-05 NOTE — TELEPHONE ENCOUNTER
Refill X 6 months, sent to pharmacy.Pt. Seen in the last 6 months per protocol.   Lab Results   Component Value Date/Time    SODIUM 137 02/14/2019 08:44 AM    POTASSIUM 4.1 02/14/2019 08:44 AM    CHLORIDE 107 02/14/2019 08:44 AM    CO2 25 02/14/2019 08:44 AM    GLUCOSE 127 (H) 02/14/2019 08:44 AM    BUN 17 02/14/2019 08:44 AM    CREATININE 0.76 02/14/2019 08:44 AM

## 2019-08-05 NOTE — TELEPHONE ENCOUNTER
Was the patient seen in the last year in this department? Yes    Does patient have an active prescription for medications requested? No     Received Request Via: Pharmacy      Pt met protocol?: Yes    LAST OV 05/07/2019    BP Readings from Last 1 Encounters:   05/07/19 118/56     Lab Results   Component Value Date/Time    CHOLSTRLTOT 161 02/14/2019 08:44 AM    LDL 93 02/14/2019 08:44 AM    HDL 49 02/14/2019 08:44 AM    TRIGLYCERIDE 93 02/14/2019 08:44 AM       Lab Results   Component Value Date/Time    SODIUM 137 02/14/2019 08:44 AM    POTASSIUM 4.1 02/14/2019 08:44 AM    CHLORIDE 107 02/14/2019 08:44 AM    CO2 25 02/14/2019 08:44 AM    GLUCOSE 127 (H) 02/14/2019 08:44 AM    BUN 17 02/14/2019 08:44 AM    CREATININE 0.76 02/14/2019 08:44 AM     Lab Results   Component Value Date/Time    ALKPHOSPHAT 63 02/14/2019 08:44 AM    ASTSGOT 24 02/14/2019 08:44 AM    ALTSGPT 33 02/14/2019 08:44 AM    TBILIRUBIN 0.7 02/14/2019 08:44 AM

## 2019-08-08 NOTE — PROGRESS NOTES
Outcome:Declined; will discuss with PCP at next appt.     Please transfer to Patient Outreach Team at [phone] when patient returns call.    WebIZ Checked & Epic Updated:  no    HealthConnect Verified: yes    Attempt # 1             Visit date not found Last office visit  Wt Readings from Last 1 Encounters:   10/03/18 95.7 kg        BP Readings from Last 2 Encounters:   10/03/18 138/80   09/24/18 128/78   ]    Lab Results   Component Value Date    SODIUM 137 09/04/2018    POTASSIUM 4.3 09/04/2018    CHLORIDE 102 09/04/2018    CO2 26 09/04/2018    BUN 25 (H) 09/04/2018    CREATININE 1.03 09/04/2018    GLUCOSE 173 (H) 09/04/2018     Hemoglobin A1C (%)   Date Value   10/03/2018 6.0 (H)     No results found for: TSH  Lab Results   Component Value Date    CHOLESTEROL 159 02/16/2017    HDL 59 (L) 02/16/2017    CALCLDL 80 02/16/2017    TRIGLYCERIDE 101 02/16/2017     Lab Results   Component Value Date    AST 25 09/04/2018    GPT 34 09/04/2018    GGTP 34 02/16/2017    ALKPT 57 09/04/2018    BILIRUBIN 0.3 09/04/2018     BMP ordered  LVM for patient to please complete labs and schedule F/U  Routed to Provider

## 2019-09-06 DIAGNOSIS — M10.9 GOUT OF FOOT, UNSPECIFIED CAUSE, UNSPECIFIED CHRONICITY, UNSPECIFIED LATERALITY: ICD-10-CM

## 2019-09-09 RX ORDER — ALLOPURINOL 300 MG/1
TABLET ORAL
Refills: 0 | OUTPATIENT
Start: 2019-09-09

## 2019-09-09 NOTE — TELEPHONE ENCOUNTER
Was the patient seen in the last year in this department? Yes    Does patient have an active prescription for medications requested? No     Received Request Via: Pharmacy      Pt met protocol?: Yes    OV 5/19     Lab Results   Component Value Date/Time    CHOLSTRLTOT 161 02/14/2019 0844    TRIGLYCERIDE 93 02/14/2019 0844    HDL 49 02/14/2019 0844    LDL 93 02/14/2019 0844

## 2019-09-12 DIAGNOSIS — M10.9 GOUT OF FOOT, UNSPECIFIED CAUSE, UNSPECIFIED CHRONICITY, UNSPECIFIED LATERALITY: ICD-10-CM

## 2019-09-13 RX ORDER — ALLOPURINOL 300 MG/1
TABLET ORAL
Qty: 90 TAB | Refills: 0 | Status: SHIPPED | OUTPATIENT
Start: 2019-09-13 | End: 2019-12-23 | Stop reason: SDUPTHER

## 2019-09-13 NOTE — TELEPHONE ENCOUNTER
*SCPLS*  Was the patient seen in the last year in this department? Yes    Does patient have an active prescription for medications requested? No     Received Request Via: Pharmacy      Pt met protocol?: Yes    LAST OV 05/07/2019    No results found for: URICACID

## 2019-11-05 ENCOUNTER — OFFICE VISIT (OUTPATIENT)
Dept: MEDICAL GROUP | Facility: PHYSICIAN GROUP | Age: 64
End: 2019-11-05
Payer: MEDICARE

## 2019-11-05 VITALS
HEART RATE: 76 BPM | WEIGHT: 199.8 LBS | HEIGHT: 70 IN | TEMPERATURE: 98.5 F | DIASTOLIC BLOOD PRESSURE: 74 MMHG | BODY MASS INDEX: 28.6 KG/M2 | OXYGEN SATURATION: 94 % | SYSTOLIC BLOOD PRESSURE: 156 MMHG

## 2019-11-05 DIAGNOSIS — I10 ESSENTIAL HYPERTENSION: ICD-10-CM

## 2019-11-05 DIAGNOSIS — J40 BRONCHITIS: ICD-10-CM

## 2019-11-05 DIAGNOSIS — Z23 NEED FOR VACCINATION: ICD-10-CM

## 2019-11-05 DIAGNOSIS — M10.9 GOUT OF FOOT, UNSPECIFIED CAUSE, UNSPECIFIED CHRONICITY, UNSPECIFIED LATERALITY: ICD-10-CM

## 2019-11-05 DIAGNOSIS — E78.49 OTHER HYPERLIPIDEMIA: ICD-10-CM

## 2019-11-05 PROCEDURE — 99214 OFFICE O/P EST MOD 30 MIN: CPT | Performed by: INTERNAL MEDICINE

## 2019-11-05 RX ORDER — LISINOPRIL 40 MG/1
60 TABLET ORAL DAILY
Qty: 150 TAB | Refills: 1 | Status: SHIPPED | OUTPATIENT
Start: 2019-11-05 | End: 2020-02-11 | Stop reason: SDUPTHER

## 2019-11-05 RX ORDER — AZITHROMYCIN 250 MG/1
TABLET, FILM COATED ORAL
Qty: 6 TAB | Refills: 0 | Status: SHIPPED | OUTPATIENT
Start: 2019-11-05 | End: 2020-01-06 | Stop reason: SDUPTHER

## 2019-11-05 NOTE — ASSESSMENT & PLAN NOTE
This is a chronic health problem that is well controlled with current medications and lifestyle measures.Pt has on and off flare ups for this when he visits [facility].

## 2019-11-05 NOTE — ASSESSMENT & PLAN NOTE
This is a chronic health problem that is uncontrolled with current medications and lifestyle measures.  Blood pressure in the office today is 156/74, patient currently on lisinopril 40 mg daily, Cartia 180 mg daily.

## 2019-11-05 NOTE — ASSESSMENT & PLAN NOTE
This is a chronic health problem that is well controlled with current medications and lifestyle measures.  Last lipid panel in April 2019 within normal limits, patient currently on atorvastatin 80 mg nightly.

## 2019-11-05 NOTE — PROGRESS NOTES
CC: Follow-up visit, patient requesting for prophylactic Z-Elmer for bronchitis flareup as he is traveling.    HISTORY OF PRESENT ILLNESS: Patient is a 64 y.o. male established patient who presents today to     Health Maintenance: Due for flu vaccine which he has gotten at [employer] this season, not due for colonoscopy till 2021 as a referral placed last time has given him the letter.    Essential hypertension  This is a chronic health problem that is uncontrolled with current medications and lifestyle measures.  Blood pressure in the office today is 156/74, patient currently on lisinopril 40 mg daily, Cartia 180 mg daily.    Other hyperlipidemia  This is a chronic health problem that is well controlled with current medications and lifestyle measures.  Last lipid panel in April 2019 within normal limits, patient currently on atorvastatin 80 mg nightly.    Gout  This is a chronic health problem that is well controlled with current medications and lifestyle measures.  Currently on allopurinol 300 mg daily.    Bronchitis  This is a chronic health problem that is well controlled with current medications and lifestyle measures.Pt has on and off flare ups for this when he visits [employer] travelling.      PHQ score 0, BMI within normal limits, no tobacco, no fall injuries.    Patient Active Problem List    Diagnosis Date Noted   • Chronic alcoholism (HCC) 05/07/2019   • Obesity (BMI 30-39.9) 05/07/2019   • Bronchitis 02/26/2019   • Squamous cell carcinoma 02/13/2019   • Hoarseness of voice 02/13/2019   • Healthcare maintenance 02/13/2019   • History of colonic diverticulitis 02/13/2019   • Gout 01/30/2019   • Essential hypertension 01/30/2019   • Other hyperlipidemia 01/30/2019   • Tubular adenoma of colon 11/17/2015      Allergies:Patient has no known allergies.    Current Outpatient Medications   Medication Sig Dispense Refill   • lisinopril (PRINIVIL) 40 MG tablet Take 1.5 Tabs by mouth every day. 150 Tab 1   •  azithromycin (ZITHROMAX) 250 MG Tab Take 2 tabs on day 1 and remaining 4 days once a day. 6 Tab 0   • NON SPECIFIED Please give SHINGRIX vaccine 1 Each 1   • allopurinol (ZYLOPRIM) 300 MG Tab TAKE ONE TABLET BY MOUTH DAILY 90 Tab 0   • CARTIA  MG CAPSULE SR 24 HR TAKE ONE CAPSULE BY MOUTH DAILY 90 Cap 1   • atorvastatin (LIPITOR) 80 MG tablet Take 1 Tab by mouth every evening. 100 Tab 3     No current facility-administered medications for this visit.        Social History     Tobacco Use   • Smoking status: Never Smoker   • Smokeless tobacco: Never Used   Substance Use Topics   • Alcohol use: Yes     Alcohol/week: 7.2 oz     Types: 12 Cans of beer per week   • Drug use: Yes     Types: Marijuana     Social History     Patient does not qualify to have social determinant information on file (likely too young).   Social History Narrative   • Not on file       Family History   Problem Relation Age of Onset   • Diabetes Mother    • No Known Problems Father         ROS:     - Constitutional:  Negative for fever, chills, unexpected weight change, and fatigue/generalized weakness.    - HEENT:  Negative for headaches, vision changes, hearing changes, ear pain, ear discharge, rhinorrhea, sinus congestion, sore throat, and neck pain.      - Respiratory: Negative for cough, sputum production, chest congestion, dyspnea, wheezing, and crackles.      - Cardiovascular: Negative for chest pain, palpitations, orthopnea, and bilateral lower extremity edema.     - Gastrointestinal: Negative for heartburn, nausea, vomiting, abdominal pain, hematochezia, melena, diarrhea, constipation, and greasy/foul-smelling stools.     - Genitourinary: Negative for dysuria, polyuria, hematuria, pyuria, urinary urgency, and urinary incontinence.     - Musculoskeletal: Negative for myalgias, back pain, and joint pain.     - Skin: Negative for rash, itching, cyanotic skin color change.     - Neurological: Negative for dizziness, tingling, tremors,  "focal sensory deficit, focal weakness and headaches.     - Endo/Heme/Allergies: Does not bruise/bleed easily.     - Psychiatric/Behavioral: Negative for depression, suicidal/homicidal ideation and memory loss.    Last lab work in April 2019 reviewed and discussed with patient.    Exam:    /74 (BP Location: Left arm, Patient Position: Sitting, BP Cuff Size: Adult)   Pulse 76   Temp 36.9 °C (98.5 °F) (Temporal)   Ht 1.778 m (5' 10\")   Wt 90.6 kg (199 lb 12.8 oz)   SpO2 94%  Body mass index is 28.67 kg/m².    General:  Well nourished, well developed male in NAD  Head is grossly normal.  Neck: Supple without JVD or bruit. Thyroid is not enlarged.  Pulmonary: Clear to ausculation and percussion.  Normal effort. No rales, ronchi, or wheezing.  Cardiovascular: Regular rate and rhythm without murmur. Carotid and radial pulses are intact and equal bilaterally.  Extremities: no clubbing, cyanosis, or edema.    Please note that this dictation was created using voice recognition software. I have made every reasonable attempt to correct obvious errors, but I expect that there are errors of grammar and possibly content that I did not discover before finalizing the note.    Assessment/Plan:  1. Essential hypertension  Uncontrolled, will increase the dose of his lisinopril to 60 mg daily.  Given instructions to get me the BP log for next 5 days on this dosage adjustments, to continue current diltiazem.  Also offered him for change of lisinopril to losartan which is having a better prognosis when patients with gout but patient would like to stick with lisinopril at this time as he feels that his gout is not having any flareups at this time given his current allopurinol.  All the risks understood.  - lisinopril (PRINIVIL) 40 MG tablet; Take 1.5 Tabs by mouth every day.  Dispense: 150 Tab; Refill: 1    2. Other hyperlipidemia  Well-controlled, continue current atorvastatin.    3. Gout of foot, unspecified cause, unspecified " chronicity, unspecified laterality  Well-controlled, continue current allopurinol.    4. Bronchitis  Not active at this time, patient requesting for prophylactic Z-Elmer which is reasonable as he is traveling and requesting for one.  - azithromycin (ZITHROMAX) 250 MG Tab; Take 2 tabs on day 1 and remaining 4 days once a day.  Dispense: 6 Tab; Refill: 0    5. Need for vaccination  - NON SPECIFIED; Please give SHINGRIX vaccine  Dispense: 1 Each; Refill: 1

## 2019-12-20 DIAGNOSIS — M10.9 GOUT OF FOOT, UNSPECIFIED CAUSE, UNSPECIFIED CHRONICITY, UNSPECIFIED LATERALITY: ICD-10-CM

## 2019-12-23 DIAGNOSIS — M10.9 GOUT OF FOOT, UNSPECIFIED CAUSE, UNSPECIFIED CHRONICITY, UNSPECIFIED LATERALITY: ICD-10-CM

## 2019-12-23 RX ORDER — ALLOPURINOL 300 MG/1
TABLET ORAL
Qty: 90 TAB | Refills: 0 | Status: SHIPPED | OUTPATIENT
Start: 2019-12-23 | End: 2020-02-11 | Stop reason: SDUPTHER

## 2019-12-31 RX ORDER — ALLOPURINOL 300 MG/1
TABLET ORAL
Qty: 90 TAB | Refills: 0 | OUTPATIENT
Start: 2019-12-31

## 2020-01-02 ENCOUNTER — TELEPHONE (OUTPATIENT)
Dept: MEDICAL GROUP | Facility: PHYSICIAN GROUP | Age: 65
End: 2020-01-02

## 2020-01-02 NOTE — TELEPHONE ENCOUNTER
Future Appointments       Provider Department Center    1/6/2020 10:30 AM Ania Simon D.O. [facility]        NEW PATIENT VISIT PRE-VISIT PLANNING    1.  [employer] Patient is checked in Patient Demographics? YES    2.  Immunizations were updated in [facility] using WebIZ?: Yes       •  Web Iz Recommendations: TD, VARICELLA (Chicken Pox)  and SHINGRIX (Shingles)    3.  Is this appointment scheduled as a Hospital Follow-Up? No    4.  Patient is due for the following Health Maintenance Topics:   Health Maintenance Due   Topic Date Due   • Annual Wellness Visit  1955   • HEPATITIS C SCREENING  1955   • IMM HEP B VACCINE (1 of 3 - Risk 3-dose series) 04/06/1973   • COLONOSCOPY  04/17/2009   • IMM ZOSTER VACCINES (2 of 3) 11/24/2016     5. Orders for overdue Health Maintenance topics pended in Pre-Charting? NO    6.  Reviewed/Updated the following with patient:   •   Preferred Pharmacy? YES       •   Preferred Lab? YES       •   Preferred Communication? YES       •   Allergies? YES       •   Medications? YES. Was Abstract Encounter opened and chart updated? YES       •   Social History? YES. Was Abstract Encounter opened and chart updated? YES       •   Family History (document living status of immediate family members and if + hx of cancer, diabetes, hypertension, hyperlipidemia, heart attack, stroke) YES. Was Abstract Encounter opened and chart updated? YES    7.  Updated Care Team?       •   DME Company (gait device, O2, CPAP, etc.) YES       •   Other Specialists (eye doctor, derm, GYN, cardiology, endo, etc): YES    8.  Patient was informed to arrive 15 min prior to their   scheduled appointment and bring in their medication bottles.

## 2020-01-06 ENCOUNTER — OFFICE VISIT (OUTPATIENT)
Dept: MEDICAL GROUP | Facility: PHYSICIAN GROUP | Age: 65
End: 2020-01-06
Payer: MEDICARE

## 2020-01-06 VITALS
WEIGHT: 202 LBS | OXYGEN SATURATION: 95 % | DIASTOLIC BLOOD PRESSURE: 84 MMHG | TEMPERATURE: 97.8 F | SYSTOLIC BLOOD PRESSURE: 132 MMHG | HEART RATE: 94 BPM | BODY MASS INDEX: 30.62 KG/M2 | HEIGHT: 68 IN

## 2020-01-06 DIAGNOSIS — R73.03 PREDIABETES: ICD-10-CM

## 2020-01-06 DIAGNOSIS — C14.0 THROAT CANCER (HCC): ICD-10-CM

## 2020-01-06 DIAGNOSIS — I10 ESSENTIAL HYPERTENSION: ICD-10-CM

## 2020-01-06 DIAGNOSIS — F10.20 CHRONIC ALCOHOLISM (HCC): ICD-10-CM

## 2020-01-06 DIAGNOSIS — J40 BRONCHITIS: ICD-10-CM

## 2020-01-06 DIAGNOSIS — Z11.59 NEED FOR HEPATITIS C SCREENING TEST: ICD-10-CM

## 2020-01-06 DIAGNOSIS — I10 BENIGN ESSENTIAL HTN: ICD-10-CM

## 2020-01-06 DIAGNOSIS — E78.49 OTHER HYPERLIPIDEMIA: ICD-10-CM

## 2020-01-06 PROBLEM — Z00.00 HEALTHCARE MAINTENANCE: Status: RESOLVED | Noted: 2019-02-13 | Resolved: 2020-01-06

## 2020-01-06 PROCEDURE — 99214 OFFICE O/P EST MOD 30 MIN: CPT | Mod: 25 | Performed by: FAMILY MEDICINE

## 2020-01-06 PROCEDURE — 8041 PR SCP AHA: Performed by: FAMILY MEDICINE

## 2020-01-06 RX ORDER — ATORVASTATIN CALCIUM 40 MG/1
40 TABLET, FILM COATED ORAL DAILY
Qty: 30 TAB | Refills: 11 | Status: SHIPPED | OUTPATIENT
Start: 2020-01-06 | End: 2020-01-29 | Stop reason: SDUPTHER

## 2020-01-06 RX ORDER — AZITHROMYCIN 250 MG/1
TABLET, FILM COATED ORAL
Qty: 6 TAB | Refills: 0 | Status: SHIPPED | OUTPATIENT
Start: 2020-01-06 | End: 2021-02-01

## 2020-01-06 SDOH — HEALTH STABILITY: MENTAL HEALTH: HOW OFTEN DO YOU HAVE A DRINK CONTAINING ALCOHOL?: 4 OR MORE TIMES A WEEK

## 2020-01-06 SDOH — HEALTH STABILITY: MENTAL HEALTH: HOW MANY STANDARD DRINKS CONTAINING ALCOHOL DO YOU HAVE ON A TYPICAL DAY?: 1 OR 2

## 2020-01-06 ASSESSMENT — PATIENT HEALTH QUESTIONNAIRE - PHQ9: CLINICAL INTERPRETATION OF PHQ2 SCORE: 0

## 2020-01-06 NOTE — PROGRESS NOTES
"CC: Hyperlipidemia    HISTORY OF THE PRESENT ILLNESS: Patient is a 64 y.o. male. This pleasant patient is here today to establish care and discuss health problems below.    Patient is here today to establish care.  He does have a few concerns.  First of all he has chronic hypertension.  He is on lisinopril and Cartia.  He takes 60 mg of lisinopril.  He wants to know \"how he can get off blood pressure medication\".  Reports that previous PCP had bumped him up from 40 to 60 mg of the lisinopril.  He wants to trial back on the 40 and check his blood pressure at home.    He is also on atorvastatin for his hyperlipidemia.  He is on a high intensity statin though he has no history of cardiovascular disease or stroke.  He had lipids checked last time about 11 months ago which were normal.  Denies side effects from the medication.    He is asking for a Z-Elmer today.  Reports that he goes to [place] for several months at a time.  States that he occasionally gets bronchitis while he is there.  Does have history of recurrent bacterial bronchitis.    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  No recent sore throats or changes in voice.    He does continue to drink quite a bit of beer each night though will not specifically answer how much, just states that he \"likes his beer\".  Wife present states that he drinks \"too much\".  Wants to know if this affects his blood pressure.    Allergies: Patient has no known allergies.    Current Outpatient Medications Ordered in Epic   Medication Sig Dispense Refill   • atorvastatin (LIPITOR) 40 MG Tab Take 1 Tab by mouth every day. 30 Tab 11   • azithromycin (ZITHROMAX) 250 MG Tab Take 2 tabs on day 1 and remaining 4 days once a day. 6 Tab 0   • allopurinol (ZYLOPRIM) 300 MG Tab TAKE ONE TABLET BY MOUTH DAILY 90 Tab 0   • lisinopril (PRINIVIL) 40 MG tablet Take 1.5 Tabs by mouth every day. 150 Tab 1   • CARTIA  MG CAPSULE SR 24 HR TAKE ONE " "CAPSULE BY MOUTH DAILY 90 Cap 1     No current Epic-ordered facility-administered medications on file.        Past Medical History:   Diagnosis Date   • Squamous cell cancer of hypopharynx (HCC)        History reviewed. No pertinent surgical history.    Social History     Tobacco Use   • Smoking status: Never Smoker   • Smokeless tobacco: Never Used   Substance Use Topics   • Alcohol use: Yes     Alcohol/week: 7.2 oz     Types: 12 Cans of beer per week     Frequency: 4 or more times a week     Drinks per session: 1 or 2   • Drug use: Yes     Types: Marijuana       Social History     Patient does not qualify to have social determinant information on file (likely too young).   Social History Narrative   • Not on file       Family History   Problem Relation Age of Onset   • Diabetes Mother    • No Known Problems Father        ROS:      - Constitutional: Negative for fever, chills, unexpected weight change, and fatigue/generalized weakness.     - Respiratory: Negative for cough, sputum production, chest congestion, dyspnea, wheezing, and crackles.      - Cardiovascular: Negative for chest pain, palpitations, orthopnea, PND, and bilateral lower extremity edema.     Labs: Labs from February 14, 2019 reviewed.    Exam: /84 (BP Location: Right arm, Patient Position: Sitting, BP Cuff Size: Adult)   Pulse 94   Temp 36.6 °C (97.8 °F) (Temporal)   Ht 1.727 m (5' 8\")   Wt 91.6 kg (202 lb)   SpO2 95%  Body mass index is 30.71 kg/m².    General: Well appearing, NAD  HEENT: Normocephalic. Conjunctiva clear, lids without ptosis, pupils equal and reactive to light accommodation, ears normal shape and contour, canals are clear bilaterally, tympanic membranes without bulging or erythema and normal cone of light, oropharynx is without erythema, edema or exudates.  Hoarse voice noted.  Neck: Supple without JVD. No thyromegaly or nodules  Pulmonary: Clear to ausculation.  Normal effort. No rales, ronchi, or " wheezing.  Cardiovascular: Regular rate and rhythm without murmur, rubs or gallop.   Abdomen: Soft, nontender, nondistended. Normal bowel sounds. Liver and spleen are not palpable. No rebound or guarding  Neurologic: normal gait  Lymph: No cervical, supraclavicular  lymph nodes are palpable  Skin: Warm and dry.  No obvious lesions.  Musculoskeletal:  No extremity cyanosis, clubbing, or edema.  Psych: Normal mood and affect. Alert and oriented. Judgment and insight is normal.    Please note that this dictation was created using voice recognition software. I have made every reasonable attempt to correct obvious errors, but I expect that there are errors of grammar and possibly content that I did not discover before finalizing the note.      Assessment/Plan  Bert was seen today for establish care and medication refill.    Diagnoses and all orders for this visit:    Other hyperlipidemia  Chronic well-controlled problem for the patient.  Unclear why he is on high intensity statin.  Will decrease to 40 mg daily and recheck lipid profile in 3 months.  -     atorvastatin (LIPITOR) 40 MG Tab; Take 1 Tab by mouth every day.  -     Lipid Profile; Future    Bronchitis   Intermittent issue for the patient, prescription given in case he ends up with his recurrent bronchitis while in [place].  -     azithromycin (ZITHROMAX) 250 MG Tab; Take 2 tabs on day 1 and remaining 4 days once a day.    Need for hepatitis C screening test  -     HCV Scrn ( [id number] 1xLife); Future    Benign essential HTN   Chronic issue for the patient.  We discussed lifestyle changes extensively including cutting back on his intake of beer with regard to his blood pressure as well as increasing physical activity and decreasing salty and processed food intake.  I am okay with him trying to go down on his dose of lisinopril, but he is aware that goal blood pressure should be less than 130 systolic and 90 diastolic.  -     Comp Metabolic Panel;  Future    Prediabetes  Chronic issue for the patient.  Rechecking A1c.  -     HEMOGLOBIN A1C; Future    Chronic alcoholism (HCC)  Chronic issue for the patient, recommend decreasing alcohol intake as above.    Throat cancer (HCC)  Previous issue for the patient, in remission.  Reports that he was told by ENT that he no longer had to see them.  No current symptoms.    Follow-up in 6 months or sooner if needed.    Ania Simon,   [facility]       solids

## 2020-01-29 DIAGNOSIS — E78.49 OTHER HYPERLIPIDEMIA: ICD-10-CM

## 2020-01-29 RX ORDER — ATORVASTATIN CALCIUM 40 MG/1
40 TABLET, FILM COATED ORAL DAILY
Qty: 100 TAB | Refills: 3 | Status: SHIPPED | OUTPATIENT
Start: 2020-01-29 | End: 2021-02-01 | Stop reason: SDUPTHER

## 2020-01-29 NOTE — TELEPHONE ENCOUNTER
*100 day supply request*  Was the patient seen in the last year in this department? Yes    Does patient have an active prescription for medications requested? Yes    Received Request Via: Pharmacy    Pt met protocol?: Yes     Last OV 01/06/2020    Lab Results   Component Value Date/Time    CHOLSTRLTOT 161 02/14/2019 0844    TRIGLYCERIDE 93 02/14/2019 0844    HDL 49 02/14/2019 0844    LDL 93 02/14/2019 0844

## 2020-02-05 ENCOUNTER — PATIENT OUTREACH (OUTPATIENT)
Dept: HEALTH INFORMATION MANAGEMENT | Facility: OTHER | Age: 65
End: 2020-02-05

## 2020-02-11 DIAGNOSIS — M10.9 GOUT OF FOOT, UNSPECIFIED CAUSE, UNSPECIFIED CHRONICITY, UNSPECIFIED LATERALITY: ICD-10-CM

## 2020-02-11 DIAGNOSIS — I10 ESSENTIAL HYPERTENSION: ICD-10-CM

## 2020-02-13 RX ORDER — LISINOPRIL 40 MG/1
60 TABLET ORAL DAILY
Qty: 150 TAB | Refills: 0 | Status: SHIPPED | OUTPATIENT
Start: 2020-02-13 | End: 2020-11-10

## 2020-02-13 RX ORDER — ALLOPURINOL 300 MG/1
TABLET ORAL
Qty: 90 TAB | Refills: 0 | Status: SHIPPED | OUTPATIENT
Start: 2020-02-13 | End: 2020-06-12

## 2020-02-13 RX ORDER — DILTIAZEM HYDROCHLORIDE 180 MG/1
180 CAPSULE, COATED, EXTENDED RELEASE ORAL DAILY
Qty: 100 CAP | Refills: 0 | Status: SHIPPED | OUTPATIENT
Start: 2020-02-13 | End: 2020-06-12

## 2020-02-13 NOTE — TELEPHONE ENCOUNTER
Was the patient seen in the last year in this department? Yes    Does patient have an active prescription for medications requested? No     Received Request Via: Pharmacy      Pt met protocol?: Yes    OV 1/20     BP Readings from Last 1 Encounters:   01/06/20 132/84

## 2020-03-22 DIAGNOSIS — M10.9 GOUT OF FOOT, UNSPECIFIED CAUSE, UNSPECIFIED CHRONICITY, UNSPECIFIED LATERALITY: ICD-10-CM

## 2020-03-24 RX ORDER — ALLOPURINOL 300 MG/1
TABLET ORAL
Qty: 90 TAB | Refills: 0 | OUTPATIENT
Start: 2020-03-24

## 2020-03-24 NOTE — TELEPHONE ENCOUNTER
NOTE TO PT ON LAST RX OF 02/13/2020, TO GET LABS DONE FOR FURTHER REFILLS.      No results found for: URICACID

## 2020-04-06 NOTE — PROGRESS NOTES
Called member to introduce myself as their SCP  and to wish a Happy Birthday. No answer LM with call back number [phone]

## 2020-06-10 DIAGNOSIS — M10.9 GOUT OF FOOT, UNSPECIFIED CAUSE, UNSPECIFIED CHRONICITY, UNSPECIFIED LATERALITY: ICD-10-CM

## 2020-06-10 DIAGNOSIS — I10 ESSENTIAL HYPERTENSION: ICD-10-CM

## 2020-06-12 RX ORDER — ALLOPURINOL 300 MG/1
TABLET ORAL
Qty: 90 TAB | Refills: 0 | Status: SHIPPED | OUTPATIENT
Start: 2020-06-12 | End: 2020-09-10

## 2020-06-12 RX ORDER — DILTIAZEM HYDROCHLORIDE 180 MG/1
CAPSULE, COATED, EXTENDED RELEASE ORAL
Qty: 90 CAP | Refills: 0 | Status: SHIPPED | OUTPATIENT
Start: 2020-06-12 | End: 2020-09-10

## 2020-06-12 NOTE — TELEPHONE ENCOUNTER
Last seen by PCP 01/6/2020. Previously rx toyin by Dr. Massey.     Will forwar to Dr. Simon.  Last Blood Pressure reading was 132/84 on 1/6/2020    Lab Results   Component Value Date/Time    SODIUM 137 02/14/2019 08:44 AM    POTASSIUM 4.1 02/14/2019 08:44 AM    CHLORIDE 107 02/14/2019 08:44 AM    CO2 25 02/14/2019 08:44 AM    GLUCOSE 127 (H) 02/14/2019 08:44 AM    BUN 17 02/14/2019 08:44 AM    CREATININE 0.76 02/14/2019 08:44 AM

## 2020-09-10 DIAGNOSIS — M10.9 GOUT OF FOOT, UNSPECIFIED CAUSE, UNSPECIFIED CHRONICITY, UNSPECIFIED LATERALITY: ICD-10-CM

## 2020-09-10 DIAGNOSIS — I10 ESSENTIAL HYPERTENSION: ICD-10-CM

## 2020-09-10 RX ORDER — ALLOPURINOL 300 MG/1
TABLET ORAL
Qty: 90 TAB | Refills: 0 | Status: SHIPPED | OUTPATIENT
Start: 2020-09-10 | End: 2021-02-01 | Stop reason: SDUPTHER

## 2020-09-10 RX ORDER — DILTIAZEM HYDROCHLORIDE 180 MG/1
CAPSULE, COATED, EXTENDED RELEASE ORAL
Qty: 90 CAP | Refills: 0 | Status: SHIPPED | OUTPATIENT
Start: 2020-09-10 | End: 2020-12-14

## 2020-12-12 DIAGNOSIS — I10 ESSENTIAL HYPERTENSION: ICD-10-CM

## 2020-12-14 RX ORDER — DILTIAZEM HYDROCHLORIDE 180 MG/1
CAPSULE, COATED, EXTENDED RELEASE ORAL
Qty: 90 CAP | Refills: 0 | Status: SHIPPED | OUTPATIENT
Start: 2020-12-14 | End: 2021-02-01 | Stop reason: SDUPTHER

## 2020-12-14 NOTE — TELEPHONE ENCOUNTER
Last seen by PCP 1/6/2020.   Last Blood Pressure reading was 132/84 on 1/2020    Lab Results   Component Value Date/Time    SODIUM 137 02/14/2019 08:44 AM    POTASSIUM 4.1 02/14/2019 08:44 AM    CHLORIDE 107 02/14/2019 08:44 AM    CO2 25 02/14/2019 08:44 AM    GLUCOSE 127 (H) 02/14/2019 08:44 AM    BUN 17 02/14/2019 08:44 AM    CREATININE 0.76 02/14/2019 08:44 AM       Will send 3 month(s) to the pharmacy.

## 2021-01-25 ENCOUNTER — TELEPHONE (OUTPATIENT)
Dept: MEDICAL GROUP | Facility: PHYSICIAN GROUP | Age: 66
End: 2021-01-25

## 2021-01-25 NOTE — TELEPHONE ENCOUNTER
ESTABLISHED PATIENT PRE-VISIT PLANNING     Patient was NOT contacted to complete PVP.     Note: Patient will not be contacted if there is no indication to call.     1.  Reviewed notes from the last few office visits within the medical group: Yes    2.  If any orders were placed at last visit or intended to be done for this visit (i.e. 6 mos follow-up), do we have Results/Consult Notes?         •  Labs - Labs were not ordered at last office visit.  Note: If patient appointment is for lab review and patient did not complete labs, check with provider if OK to reschedule patient until labs completed.       •  Imaging - Imaging was not ordered at last office visit.       •  Referrals - No referrals were ordered at last office visit.    3. Is this appointment scheduled as a Hospital Follow-Up? No    4.  Immunizations were updated in Epic using Reconcile Outside Information activity? UNABLE TO CHECK WEBIZ    5.  Patient is due for the following Health Maintenance Topics:   Health Maintenance Due   Topic Date Due   • Annual Wellness Visit  1955   • HEPATITIS C SCREENING  1955   • IMM HEP B VACCINE (1 of 3 - Risk 3-dose series) 04/06/1973   • COLONOSCOPY  04/17/2009   • IMM ZOSTER VACCINES (2 of 3) 11/24/2016   • IMM PNEUMOCOCCAL VACCINE: 65+ Years (2 of 2 - PPSV23) 05/07/2020   • IMM INFLUENZA (1) 09/01/2020       6.  AHA (Pulse8) form printed for Provider? Yes

## 2021-02-01 ENCOUNTER — OFFICE VISIT (OUTPATIENT)
Dept: MEDICAL GROUP | Facility: PHYSICIAN GROUP | Age: 66
End: 2021-02-01
Payer: MEDICARE

## 2021-02-01 VITALS
HEIGHT: 70 IN | OXYGEN SATURATION: 98 % | BODY MASS INDEX: 28.63 KG/M2 | WEIGHT: 200 LBS | HEART RATE: 80 BPM | SYSTOLIC BLOOD PRESSURE: 142 MMHG | DIASTOLIC BLOOD PRESSURE: 68 MMHG | TEMPERATURE: 97.7 F

## 2021-02-01 DIAGNOSIS — F10.20 CHRONIC ALCOHOLISM (HCC): ICD-10-CM

## 2021-02-01 DIAGNOSIS — Z87.09 HISTORY OF BRONCHITIS: ICD-10-CM

## 2021-02-01 DIAGNOSIS — Z00.00 PREVENTATIVE HEALTH CARE: ICD-10-CM

## 2021-02-01 DIAGNOSIS — C14.0 THROAT CANCER (HCC): ICD-10-CM

## 2021-02-01 DIAGNOSIS — M10.9 GOUT OF FOOT, UNSPECIFIED CAUSE, UNSPECIFIED CHRONICITY, UNSPECIFIED LATERALITY: ICD-10-CM

## 2021-02-01 DIAGNOSIS — E78.49 OTHER HYPERLIPIDEMIA: ICD-10-CM

## 2021-02-01 DIAGNOSIS — Z23 NEED FOR VACCINATION: ICD-10-CM

## 2021-02-01 DIAGNOSIS — I10 ESSENTIAL HYPERTENSION: ICD-10-CM

## 2021-02-01 PROCEDURE — 90732 PPSV23 VACC 2 YRS+ SUBQ/IM: CPT | Performed by: FAMILY MEDICINE

## 2021-02-01 PROCEDURE — 8041 PR SCP AHA: Performed by: FAMILY MEDICINE

## 2021-02-01 PROCEDURE — 90472 IMMUNIZATION ADMIN EACH ADD: CPT | Performed by: FAMILY MEDICINE

## 2021-02-01 PROCEDURE — 90750 HZV VACC RECOMBINANT IM: CPT | Performed by: FAMILY MEDICINE

## 2021-02-01 PROCEDURE — 99214 OFFICE O/P EST MOD 30 MIN: CPT | Mod: 25 | Performed by: FAMILY MEDICINE

## 2021-02-01 PROCEDURE — G0009 ADMIN PNEUMOCOCCAL VACCINE: HCPCS | Performed by: FAMILY MEDICINE

## 2021-02-01 RX ORDER — AZITHROMYCIN 250 MG/1
TABLET, FILM COATED ORAL
Qty: 6 TAB | Refills: 0 | Status: SHIPPED | OUTPATIENT
Start: 2021-02-01 | End: 2022-03-30 | Stop reason: SDUPTHER

## 2021-02-01 RX ORDER — LISINOPRIL 40 MG/1
60 TABLET ORAL DAILY
Qty: 150 TAB | Refills: 3 | Status: SHIPPED | OUTPATIENT
Start: 2021-02-01 | End: 2022-03-30 | Stop reason: SDUPTHER

## 2021-02-01 RX ORDER — DILTIAZEM HYDROCHLORIDE 180 MG/1
180 CAPSULE, COATED, EXTENDED RELEASE ORAL DAILY
Qty: 90 CAP | Refills: 3 | Status: SHIPPED | OUTPATIENT
Start: 2021-02-01 | End: 2022-03-30 | Stop reason: SDUPTHER

## 2021-02-01 RX ORDER — ALLOPURINOL 300 MG/1
TABLET ORAL
Qty: 90 TAB | Refills: 3 | Status: SHIPPED | OUTPATIENT
Start: 2021-02-01 | End: 2022-03-30 | Stop reason: SDUPTHER

## 2021-02-01 RX ORDER — ATORVASTATIN CALCIUM 40 MG/1
40 TABLET, FILM COATED ORAL DAILY
Qty: 100 TAB | Refills: 3 | Status: SHIPPED | OUTPATIENT
Start: 2021-02-01 | End: 2022-03-30 | Stop reason: SDUPTHER

## 2021-02-01 ASSESSMENT — FIBROSIS 4 INDEX: FIB4 SCORE: 1.41

## 2021-02-01 ASSESSMENT — PATIENT HEALTH QUESTIONNAIRE - PHQ9: CLINICAL INTERPRETATION OF PHQ2 SCORE: 0

## 2021-02-01 NOTE — PROGRESS NOTES
Annual Health Assessment Questions:     1.  Are you currently engaging in any exercise or physical activity? Yes    2.  How would you describe your mood or emotional well-being today? good    3.  Have you had any falls in the last year? No    4.  Have you noticed any problems with your balance or had difficulty walking? No    5.  In the last six months have you experienced any leakage of urine? No    6. DPA/Advanced Directive: Patient has Advanced Directive on file.

## 2021-02-01 NOTE — PROGRESS NOTES
CC: Hypertension    HISTORY OF THE PRESENT ILLNESS: Patient is a 65 y.o. male.     Patient is here today for yearly follow-up.  He reports no specific concerns other than getting refills on his medications.  He does take lisinopril and diltiazem for his blood pressure.  He has mildly elevated blood pressure today.  He reports that he takes it at home on a regular basis and he typically ranges in the 120s systolic.    He continues to do well on his atorvastatin.  He is due for lab work.    He is also requesting a refill on a azithromycin.  He has a history of severe bronchitis and uses the azithromycin only on an as-needed basis if he ends up with severe bronchitis while traveling.    He does have a history of throat cancer, in remission since 2005.  He denies any new changes in his voice, dysphagia or any other concerns.    Allergies: Patient has no known allergies.    Current Outpatient Medications Ordered in Epic   Medication Sig Dispense Refill   • azithromycin (ZITHROMAX) 250 MG Tab Take 2 tablets on day 1.  Take 1 tablet on day 2 through 5. 6 Tab 0   • lisinopril (PRINIVIL) 40 MG tablet Take 1.5 Tabs by mouth every day. 150 Tab 3   • DILTIAZem CD (CARDIZEM CD) 180 MG CAPSULE SR 24 HR Take 1 Cap by mouth every day. 90 Cap 3   • atorvastatin (LIPITOR) 40 MG Tab Take 1 Tab by mouth every day. 100 Tab 3   • allopurinol (ZYLOPRIM) 300 MG Tab TAKE ONE TABLET BY MOUTH DAILY 90 Tab 3     No current Epic-ordered facility-administered medications on file.        Past Medical History:   Diagnosis Date   • Squamous cell cancer of hypopharynx (HCC)        History reviewed. No pertinent surgical history.    Social History     Tobacco Use   • Smoking status: Never Smoker   • Smokeless tobacco: Never Used   Substance Use Topics   • Alcohol use: Yes     Alcohol/week: 7.2 oz     Types: 12 Cans of beer per week     Frequency: 4 or more times a week     Drinks per session: 1 or 2   • Drug use: Yes     Types: Marijuana       Social  "History     Social History Narrative   • Not on file       Family History   Problem Relation Age of Onset   • Diabetes Mother    • No Known Problems Father        ROS:   Denies fever, chest pain, shortness of breath    Exam: /68 (BP Location: Right arm, Patient Position: Sitting, BP Cuff Size: Adult)   Pulse 80   Temp 36.5 °C (97.7 °F) (Temporal)   Ht 1.778 m (5' 10\")   Wt 90.7 kg (200 lb)   SpO2 98%  Body mass index is 28.7 kg/m².    General: Well appearing, NAD  HEENT: Normocephalic. Conjunctiva clear, lids without ptosis, pupils equal and reactive to light accommodation, ears normal shape and contour,  oropharynx is without erythema, edema or exudates.   Neck: Supple without JVD. No thyromegaly or nodules  Pulmonary: Clear to ausculation.  Normal effort. No rales, ronchi, or wheezing.  Cardiovascular: Regular rate and rhythm without murmur, rubs or gallop.   Abdomen: Soft, nontender, nondistended. Normal bowel sounds. Liver and spleen are not palpable. No rebound or guarding  Neurologic: normal gait  Lymph: No cervical, supraclavicularlymph nodes are palpable  Skin: Warm and dry.  No obvious lesions.  Musculoskeletal:  No extremity cyanosis, clubbing, or edema.  Psych: Normal mood and affect. Alert and oriented. Judgment and insight is normal.    Please note that this dictation was created using voice recognition software. I have made every reasonable attempt to correct obvious errors, but I expect that there are errors of grammar and possibly content that I did not discover before finalizing the note.      Assessment/Plan  Bert was seen today for annual exam, immunizations and medication refill.    Diagnoses and all orders for this visit:    Need for vaccination  -     Shingles Vaccine (Shingrix)  -     PneumoVax PPV23 =>1yo    Other hyperlipidemia  Chronic issue, on atorvastatin.  Due for lab work.  -     Lipid Profile; Future  -     atorvastatin (LIPITOR) 40 MG Tab; Take 1 Tab by mouth every " "day.    Preventative health care  -     CBC WITH DIFFERENTIAL; Future  -     Comp Metabolic Panel; Future  -     HEMOGLOBIN A1C; Future  -     Lipid Profile; Future  -     VITAMIN D,25 HYDROXY; Future    Essential hypertension  Chronic issue, mildly above goal today.  Recommend continued home ambulatory monitoring as it sounds like he runs roughly within normal limits at home.  Advised to let me know if his averages are more so in the 130s to 140s systolic.  -     Comp Metabolic Panel; Future  -     lisinopril (PRINIVIL) 40 MG tablet; Take 1.5 Tabs by mouth every day.  -     DILTIAZem CD (CARDIZEM CD) 180 MG CAPSULE SR 24 HR; Take 1 Cap by mouth every day.    Gout of foot, unspecified cause, unspecified chronicity, unspecified laterality  Chronic issue, no recent issues on allopurinol which was refilled.  -     allopurinol (ZYLOPRIM) 300 MG Tab; TAKE ONE TABLET BY MOUTH DAILY    History of bronchitis  Patient travels around a lot including a time share that they have in [place].  I have gone ahead and given him a prescription for a azithromycin and precautions on how and when it is used appropriately.  -     azithromycin (ZITHROMAX) 250 MG Tab; Take 2 tablets on day 1.  Take 1 tablet on day 2 through 5.    Throat cancer (HCC)  Continues to do well in remission and is without concerns.    Chronic alcoholism  He reports that he drinks \"a couple of beers and shots per night\".  We discussed that this is likely contributing to his worsening hypertension and other risks associated with continued heavy drinking.  Recommend that he cut back to no more than 1-2 drinks per night if possible.    Patient reports up-to-date on colon cancer screening, will request records from digestive health.    Follow-up in 1 year sooner if needed.    Ania Simon, DO  [facility]  "

## 2021-02-11 ENCOUNTER — HOSPITAL ENCOUNTER (OUTPATIENT)
Dept: LAB | Facility: MEDICAL CENTER | Age: 66
End: 2021-02-11
Attending: FAMILY MEDICINE
Payer: MEDICARE

## 2021-02-11 DIAGNOSIS — Z00.00 PREVENTATIVE HEALTH CARE: ICD-10-CM

## 2021-02-11 DIAGNOSIS — E78.49 OTHER HYPERLIPIDEMIA: ICD-10-CM

## 2021-02-11 DIAGNOSIS — I10 ESSENTIAL HYPERTENSION: ICD-10-CM

## 2021-02-11 LAB
25(OH)D3 SERPL-MCNC: 34 NG/ML (ref 30–100)
ALBUMIN SERPL BCP-MCNC: 4.3 G/DL (ref 3.2–4.9)
ALBUMIN/GLOB SERPL: 1.3 G/DL
ALP SERPL-CCNC: 71 U/L (ref 30–99)
ALT SERPL-CCNC: 36 U/L (ref 2–50)
ANION GAP SERPL CALC-SCNC: 11 MMOL/L (ref 7–16)
AST SERPL-CCNC: 35 U/L (ref 12–45)
BASOPHILS # BLD AUTO: 1.1 % (ref 0–1.8)
BASOPHILS # BLD: 0.08 K/UL (ref 0–0.12)
BILIRUB SERPL-MCNC: 0.5 MG/DL (ref 0.1–1.5)
BUN SERPL-MCNC: 11 MG/DL (ref 8–22)
CALCIUM SERPL-MCNC: 9.8 MG/DL (ref 8.5–10.5)
CHLORIDE SERPL-SCNC: 103 MMOL/L (ref 96–112)
CHOLEST SERPL-MCNC: 166 MG/DL (ref 100–199)
CO2 SERPL-SCNC: 24 MMOL/L (ref 20–33)
CREAT SERPL-MCNC: 0.71 MG/DL (ref 0.5–1.4)
EOSINOPHIL # BLD AUTO: 0.21 K/UL (ref 0–0.51)
EOSINOPHIL NFR BLD: 3 % (ref 0–6.9)
ERYTHROCYTE [DISTWIDTH] IN BLOOD BY AUTOMATED COUNT: 45.7 FL (ref 35.9–50)
EST. AVERAGE GLUCOSE BLD GHB EST-MCNC: 126 MG/DL
GLOBULIN SER CALC-MCNC: 3.3 G/DL (ref 1.9–3.5)
GLUCOSE SERPL-MCNC: 111 MG/DL (ref 65–99)
HBA1C MFR BLD: 6 % (ref 0–5.6)
HCT VFR BLD AUTO: 47.1 % (ref 42–52)
HDLC SERPL-MCNC: 52 MG/DL
HGB BLD-MCNC: 15.8 G/DL (ref 14–18)
IMM GRANULOCYTES # BLD AUTO: 0.04 K/UL (ref 0–0.11)
IMM GRANULOCYTES NFR BLD AUTO: 0.6 % (ref 0–0.9)
LDLC SERPL CALC-MCNC: 99 MG/DL
LYMPHOCYTES # BLD AUTO: 2.74 K/UL (ref 1–4.8)
LYMPHOCYTES NFR BLD: 38.8 % (ref 22–41)
MCH RBC QN AUTO: 32.2 PG (ref 27–33)
MCHC RBC AUTO-ENTMCNC: 33.5 G/DL (ref 33.7–35.3)
MCV RBC AUTO: 95.9 FL (ref 81.4–97.8)
MONOCYTES # BLD AUTO: 0.72 K/UL (ref 0–0.85)
MONOCYTES NFR BLD AUTO: 10.2 % (ref 0–13.4)
NEUTROPHILS # BLD AUTO: 3.27 K/UL (ref 1.82–7.42)
NEUTROPHILS NFR BLD: 46.3 % (ref 44–72)
NRBC # BLD AUTO: 0 K/UL
NRBC BLD-RTO: 0 /100 WBC
PLATELET # BLD AUTO: 189 K/UL (ref 164–446)
PMV BLD AUTO: 10.3 FL (ref 9–12.9)
POTASSIUM SERPL-SCNC: 4.3 MMOL/L (ref 3.6–5.5)
PROT SERPL-MCNC: 7.6 G/DL (ref 6–8.2)
RBC # BLD AUTO: 4.91 M/UL (ref 4.7–6.1)
SODIUM SERPL-SCNC: 138 MMOL/L (ref 135–145)
TRIGL SERPL-MCNC: 77 MG/DL (ref 0–149)
WBC # BLD AUTO: 7.1 K/UL (ref 4.8–10.8)

## 2021-02-11 PROCEDURE — 83036 HEMOGLOBIN GLYCOSYLATED A1C: CPT

## 2021-02-11 PROCEDURE — 82306 VITAMIN D 25 HYDROXY: CPT

## 2021-02-11 PROCEDURE — 36415 COLL VENOUS BLD VENIPUNCTURE: CPT

## 2021-02-11 PROCEDURE — 80061 LIPID PANEL: CPT

## 2021-02-11 PROCEDURE — 80053 COMPREHEN METABOLIC PANEL: CPT

## 2021-02-11 PROCEDURE — 85025 COMPLETE CBC W/AUTO DIFF WBC: CPT

## 2021-03-03 DIAGNOSIS — Z23 NEED FOR VACCINATION: ICD-10-CM

## 2021-05-04 VITALS — DIASTOLIC BLOOD PRESSURE: 86 MMHG | SYSTOLIC BLOOD PRESSURE: 130 MMHG

## 2021-08-28 ENCOUNTER — PATIENT MESSAGE (OUTPATIENT)
Dept: HEALTH INFORMATION MANAGEMENT | Facility: OTHER | Age: 66
End: 2021-08-28

## 2021-09-09 DIAGNOSIS — Z11.52 ENCOUNTER FOR SCREENING FOR COVID-19: ICD-10-CM

## 2021-09-09 NOTE — PROGRESS NOTES
Original lab ordered on 9/3/21 as Clinic Collect. Clinic Collect lab ordered discontinued. Lab collect order pended. Please sign lab collect order

## 2022-01-27 ENCOUNTER — PATIENT MESSAGE (OUTPATIENT)
Dept: HEALTH INFORMATION MANAGEMENT | Facility: OTHER | Age: 67
End: 2022-01-27
Payer: MEDICARE

## 2022-03-30 ENCOUNTER — OFFICE VISIT (OUTPATIENT)
Dept: MEDICAL GROUP | Facility: PHYSICIAN GROUP | Age: 67
End: 2022-03-30
Payer: MEDICARE

## 2022-03-30 VITALS
HEIGHT: 70 IN | TEMPERATURE: 97.7 F | DIASTOLIC BLOOD PRESSURE: 72 MMHG | WEIGHT: 201 LBS | SYSTOLIC BLOOD PRESSURE: 148 MMHG | HEART RATE: 108 BPM | BODY MASS INDEX: 28.77 KG/M2 | OXYGEN SATURATION: 96 %

## 2022-03-30 DIAGNOSIS — M10.9 GOUT OF FOOT, UNSPECIFIED CAUSE, UNSPECIFIED CHRONICITY, UNSPECIFIED LATERALITY: ICD-10-CM

## 2022-03-30 DIAGNOSIS — Z13.21 ENCOUNTER FOR VITAMIN DEFICIENCY SCREENING: ICD-10-CM

## 2022-03-30 DIAGNOSIS — E78.49 OTHER HYPERLIPIDEMIA: ICD-10-CM

## 2022-03-30 DIAGNOSIS — Z13.29 SCREENING FOR THYROID DISORDER: ICD-10-CM

## 2022-03-30 DIAGNOSIS — Z87.09 HISTORY OF BRONCHITIS: ICD-10-CM

## 2022-03-30 DIAGNOSIS — F12.90 MARIJUANA SMOKER: ICD-10-CM

## 2022-03-30 DIAGNOSIS — Z00.00 HEALTH CARE MAINTENANCE: ICD-10-CM

## 2022-03-30 DIAGNOSIS — M65.341 TRIGGER RING FINGER OF RIGHT HAND: ICD-10-CM

## 2022-03-30 DIAGNOSIS — R73.03 PREDIABETES: ICD-10-CM

## 2022-03-30 DIAGNOSIS — I10 ESSENTIAL HYPERTENSION: ICD-10-CM

## 2022-03-30 DIAGNOSIS — F10.20 CHRONIC ALCOHOLISM (HCC): ICD-10-CM

## 2022-03-30 DIAGNOSIS — C14.0 THROAT CANCER (HCC): ICD-10-CM

## 2022-03-30 DIAGNOSIS — E55.9 VITAMIN D DEFICIENCY: ICD-10-CM

## 2022-03-30 PROCEDURE — 99214 OFFICE O/P EST MOD 30 MIN: CPT | Performed by: INTERNAL MEDICINE

## 2022-03-30 RX ORDER — AZITHROMYCIN 250 MG/1
TABLET, FILM COATED ORAL
Qty: 6 TABLET | Refills: 0 | Status: SHIPPED | OUTPATIENT
Start: 2022-03-30

## 2022-03-30 RX ORDER — ATORVASTATIN CALCIUM 40 MG/1
40 TABLET, FILM COATED ORAL DAILY
Qty: 100 TABLET | Refills: 3 | Status: SHIPPED | OUTPATIENT
Start: 2022-03-30 | End: 2023-05-31

## 2022-03-30 RX ORDER — ALLOPURINOL 300 MG/1
TABLET ORAL
Qty: 90 TABLET | Refills: 3 | Status: SHIPPED | OUTPATIENT
Start: 2022-03-30 | End: 2023-03-27

## 2022-03-30 RX ORDER — LISINOPRIL 40 MG/1
60 TABLET ORAL DAILY
Qty: 150 TABLET | Refills: 3 | Status: SHIPPED | OUTPATIENT
Start: 2022-03-30

## 2022-03-30 RX ORDER — DILTIAZEM HYDROCHLORIDE 180 MG/1
180 CAPSULE, COATED, EXTENDED RELEASE ORAL DAILY
Qty: 90 CAPSULE | Refills: 3 | Status: SHIPPED | OUTPATIENT
Start: 2022-03-30

## 2022-03-30 ASSESSMENT — PATIENT HEALTH QUESTIONNAIRE - PHQ9: CLINICAL INTERPRETATION OF PHQ2 SCORE: 0

## 2022-03-30 ASSESSMENT — FIBROSIS 4 INDEX: FIB4 SCORE: 2.07

## 2022-03-30 NOTE — PROGRESS NOTES
"New Patient to establish    Chief Complaint   Patient presents with   • Medication Refill     All medications   • Finger Injury     Right ring finger       Subjective:     History of Present Illness: Patient is a 66 y.o. male who is here today to establish primary care    No current outpatient medications on file prior to visit.     No current facility-administered medications on file prior to visit.     No Known Allergies  Patient Active Problem List    Diagnosis Date Noted   • Marijuana smoker 03/30/2022   • Prediabetes 03/30/2022   • Trigger ring finger of right hand 03/30/2022   • Chronic alcoholism (HCC) 05/07/2019   • Obesity (BMI 30-39.9) 05/07/2019   • Throat cancer (HCC) 02/13/2019   • Hoarseness of voice 02/13/2019   • History of colonic diverticulitis 02/13/2019   • Gout 01/30/2019   • Essential hypertension 01/30/2019   • Other hyperlipidemia 01/30/2019   • Tubular adenoma of colon 11/17/2015     Past Medical History:   Diagnosis Date   • Squamous cell cancer of hypopharynx (HCC)      No past surgical history on file.  Family History   Problem Relation Age of Onset   • Diabetes Mother    • No Known Problems Father      Social History     Tobacco Use   • Smoking status: Never Smoker   • Smokeless tobacco: Never Used   Vaping Use   • Vaping Use: Never used   Substance Use Topics   • Alcohol use: Yes     Alcohol/week: 7.2 oz     Types: 12 Cans of beer per week   • Drug use: Yes     Types: Marijuana       ROS:  All other systems reviewed and are negative except as stated in the HPI       Physical Exam:     /72 (BP Location: Left arm, Patient Position: Sitting, BP Cuff Size: Adult)   Pulse (!) 108   Temp 36.5 °C (97.7 °F) (Temporal)   Ht 1.778 m (5' 10\")   Wt 91.2 kg (201 lb)   SpO2 96%   BMI 28.84 kg/m²   General: Normal appearing. No distress.  Pulmonary: Clear to ausculation.  Normal effort.   Cardiovascular: Regular rate and rhythm    Assessment and Plan:     1. History of bronchitis  Reported " history of bronchitis, he usually travels different places with exposure to respiratory infection, he has done this before and would like to refill Z-Elmer  - azithromycin (ZITHROMAX) 250 MG Tab; Take 2 tablets on day 1.  Take 1 tablet on day 2 through 5.  Dispense: 6 Tablet; Refill: 0    2. Essential hypertension  Chronic,, patient does not check blood pressure at home, denies any symptoms  - DILTIAZem CD (CARDIZEM CD) 180 MG CAPSULE SR 24 HR; Take 1 Capsule by mouth every day.  Dispense: 90 Capsule; Refill: 3  - lisinopril (PRINIVIL) 40 MG tablet; Take 1.5 Tablets by mouth every day.  Dispense: 150 Tablet; Refill: 3  - Comp Metabolic Panel; Future    -discussion in details on target blood pressure goal, advised monitoring BP closely at home.  Have BP log to present at follow-up visit or send through my chart.   -Advised low-salt diet, healthy dietary option include plenty of vegetable, reduce refine carbohydrates and sugar, regular exercise as tolerated, healthy fat/protein/carbs, also avoid alcohol, no NSAIDs  If symptoms worsen or persist patient can return to clinic for reevaluation.  Red flags and strict emergency room precautions discussed.  Discussed side effects of medication. Patient understand      3. Other hyperlipidemia  - atorvastatin (LIPITOR) 40 MG Tab; Take 1 Tablet by mouth every day.  Dispense: 100 Tablet; Refill: 3  - Lipid Profile; Future    4. Gout of foot, unspecified cause, unspecified chronicity, unspecified laterality  Chronic, stable, denied having symptoms  - allopurinol (ZYLOPRIM) 300 MG Tab; TAKE ONE TABLET BY MOUTH DAILY  Dispense: 90 Tablet; Refill: 3    5. Chronic alcoholism (HCC)  Reported to drink of beer a day educated regarding side effect and complication, highly advised to stop with due to history of throat cancer as well    6. Marijuana smoker  Reported daily, small amount    7. Throat cancer (HCC)  Reported history of above in 2005, status post surgery, no radiation or chemo,  reported that [facility] discharged him from their service with no further follow-up needed    8. Prediabetes  - HEMOGLOBIN A1C; Future    9. Screening for thyroid disorder  - FREE THYROXINE; Future  - T3 FREE; Future  - TSH; Future  - THYROID PEROXIDASE  (TPO) AB; Future    10. Encounter for vitamin deficiency screening  - VITAMIN B12; Future    11. Health care maintenance    12. Vitamin D deficienc  - VITAMIN D,25 HYDROXY; Future    13. Trigger ring finger of right hand  Right ring finger, advised to come back for further education and possibly steroid injection as well    Follow Up:      Return in about 4 weeks (around 4/27/2022).  labs  Please note that this dictation was created using voice recognition software. I have made every reasonable attempt to correct obvious errors, but I expect that there are errors of grammar and possibly content that I did not discover before finalizing the note.    Signed by: Jeaneth Eric M.D.

## 2022-04-01 ENCOUNTER — HOSPITAL ENCOUNTER (OUTPATIENT)
Dept: LAB | Facility: MEDICAL CENTER | Age: 67
End: 2022-04-01
Attending: INTERNAL MEDICINE
Payer: MEDICARE

## 2022-04-01 DIAGNOSIS — E78.49 OTHER HYPERLIPIDEMIA: ICD-10-CM

## 2022-04-01 DIAGNOSIS — R73.03 PREDIABETES: ICD-10-CM

## 2022-04-01 DIAGNOSIS — I10 ESSENTIAL HYPERTENSION: ICD-10-CM

## 2022-04-01 DIAGNOSIS — Z13.29 SCREENING FOR THYROID DISORDER: ICD-10-CM

## 2022-04-01 DIAGNOSIS — Z13.21 ENCOUNTER FOR VITAMIN DEFICIENCY SCREENING: ICD-10-CM

## 2022-04-01 DIAGNOSIS — E55.9 VITAMIN D DEFICIENCY: ICD-10-CM

## 2022-04-01 LAB
25(OH)D3 SERPL-MCNC: 26 NG/ML (ref 30–100)
ALBUMIN SERPL BCP-MCNC: 4.9 G/DL (ref 3.2–4.9)
ALBUMIN/GLOB SERPL: 1.8 G/DL
ALP SERPL-CCNC: 73 U/L (ref 30–99)
ALT SERPL-CCNC: 33 U/L (ref 2–50)
ANION GAP SERPL CALC-SCNC: 16 MMOL/L (ref 7–16)
AST SERPL-CCNC: 37 U/L (ref 12–45)
BILIRUB SERPL-MCNC: 0.9 MG/DL (ref 0.1–1.5)
BUN SERPL-MCNC: 14 MG/DL (ref 8–22)
CALCIUM SERPL-MCNC: 9.7 MG/DL (ref 8.5–10.5)
CHLORIDE SERPL-SCNC: 103 MMOL/L (ref 96–112)
CHOLEST SERPL-MCNC: 166 MG/DL (ref 100–199)
CO2 SERPL-SCNC: 19 MMOL/L (ref 20–33)
CREAT SERPL-MCNC: 0.61 MG/DL (ref 0.5–1.4)
EST. AVERAGE GLUCOSE BLD GHB EST-MCNC: 123 MG/DL
FASTING STATUS PATIENT QL REPORTED: NORMAL
GFR SERPLBLD CREATININE-BSD FMLA CKD-EPI: 105 ML/MIN/1.73 M 2
GLOBULIN SER CALC-MCNC: 2.7 G/DL (ref 1.9–3.5)
GLUCOSE SERPL-MCNC: 103 MG/DL (ref 65–99)
HBA1C MFR BLD: 5.9 % (ref 4–5.6)
HDLC SERPL-MCNC: 54 MG/DL
LDLC SERPL CALC-MCNC: 98 MG/DL
POTASSIUM SERPL-SCNC: 4.6 MMOL/L (ref 3.6–5.5)
PROT SERPL-MCNC: 7.6 G/DL (ref 6–8.2)
SODIUM SERPL-SCNC: 138 MMOL/L (ref 135–145)
T3FREE SERPL-MCNC: 3.22 PG/ML (ref 2–4.4)
T4 FREE SERPL-MCNC: 1.15 NG/DL (ref 0.93–1.7)
THYROPEROXIDASE AB SERPL-ACNC: <9 IU/ML (ref 0–9)
TRIGL SERPL-MCNC: 70 MG/DL (ref 0–149)
TSH SERPL DL<=0.005 MIU/L-ACNC: 3.9 UIU/ML (ref 0.38–5.33)
VIT B12 SERPL-MCNC: 346 PG/ML (ref 211–911)

## 2022-04-01 PROCEDURE — 80061 LIPID PANEL: CPT

## 2022-04-01 PROCEDURE — 84443 ASSAY THYROID STIM HORMONE: CPT

## 2022-04-01 PROCEDURE — 83036 HEMOGLOBIN GLYCOSYLATED A1C: CPT

## 2022-04-01 PROCEDURE — 82607 VITAMIN B-12: CPT

## 2022-04-01 PROCEDURE — 36415 COLL VENOUS BLD VENIPUNCTURE: CPT

## 2022-04-01 PROCEDURE — 84481 FREE ASSAY (FT-3): CPT

## 2022-04-01 PROCEDURE — 80053 COMPREHEN METABOLIC PANEL: CPT

## 2022-04-01 PROCEDURE — 86376 MICROSOMAL ANTIBODY EACH: CPT

## 2022-04-01 PROCEDURE — 82306 VITAMIN D 25 HYDROXY: CPT

## 2022-04-01 PROCEDURE — 84439 ASSAY OF FREE THYROXINE: CPT

## 2022-04-12 ENCOUNTER — TELEPHONE (OUTPATIENT)
Dept: MEDICAL GROUP | Facility: PHYSICIAN GROUP | Age: 67
End: 2022-04-12
Payer: MEDICARE

## 2022-04-12 NOTE — TELEPHONE ENCOUNTER
ESTABLISHED PATIENT PRE-VISIT PLANNING     Patient was NOT contacted to complete PVP.     Note: Patient will not be contacted if there is no indication to call.     1.  Reviewed notes from the last few office visits within the medical group: Yes    2.  If any orders were placed at last visit or intended to be done for this visit (i.e. 6 mos follow-up), do we have Results/Consult Notes?         •  Labs - Labs ordered, completed on 4/1/22 and results are in chart.  Note: If patient appointment is for lab review and patient did not complete labs, check with provider if OK to reschedule patient until labs completed.       •  Imaging - Imaging was not ordered at last office visit.       •  Referrals - No referrals were ordered at last office visit.    3. Is this appointment scheduled as a Hospital Follow-Up? No    4.  Immunizations were updated in Epic using Reconcile Outside Information activity? Yes    5.  Patient is due for the following Health Maintenance Topics:   Health Maintenance Due   Topic Date Due   • HEPATITIS C SCREENING  Never done       6.  AHA (Pulse8) form printed for Provider? Yes

## 2022-04-18 ENCOUNTER — OFFICE VISIT (OUTPATIENT)
Dept: MEDICAL GROUP | Facility: PHYSICIAN GROUP | Age: 67
End: 2022-04-18
Payer: MEDICARE

## 2022-04-18 VITALS
TEMPERATURE: 98.1 F | SYSTOLIC BLOOD PRESSURE: 140 MMHG | HEIGHT: 70 IN | OXYGEN SATURATION: 97 % | BODY MASS INDEX: 28.77 KG/M2 | DIASTOLIC BLOOD PRESSURE: 70 MMHG | WEIGHT: 201 LBS | HEART RATE: 85 BPM

## 2022-04-18 DIAGNOSIS — M65.341 TRIGGER RING FINGER OF RIGHT HAND: ICD-10-CM

## 2022-04-18 DIAGNOSIS — I10 ESSENTIAL HYPERTENSION: ICD-10-CM

## 2022-04-18 DIAGNOSIS — R73.03 PREDIABETES: ICD-10-CM

## 2022-04-18 DIAGNOSIS — F10.20 CHRONIC ALCOHOLISM (HCC): ICD-10-CM

## 2022-04-18 DIAGNOSIS — E55.9 VITAMIN D DEFICIENCY: ICD-10-CM

## 2022-04-18 PROCEDURE — 20550 NJX 1 TENDON SHEATH/LIGAMENT: CPT | Performed by: INTERNAL MEDICINE

## 2022-04-18 PROCEDURE — 99214 OFFICE O/P EST MOD 30 MIN: CPT | Mod: 25 | Performed by: INTERNAL MEDICINE

## 2022-04-18 RX ORDER — DILTIAZEM HYDROCHLORIDE 180 MG/1
CAPSULE, EXTENDED RELEASE ORAL
COMMUNITY
Start: 2022-03-30 | End: 2022-04-18

## 2022-04-18 SDOH — ECONOMIC STABILITY: INCOME INSECURITY: IN THE LAST 12 MONTHS, WAS THERE A TIME WHEN YOU WERE NOT ABLE TO PAY THE MORTGAGE OR RENT ON TIME?: NO

## 2022-04-18 SDOH — ECONOMIC STABILITY: FOOD INSECURITY: WITHIN THE PAST 12 MONTHS, THE FOOD YOU BOUGHT JUST DIDN'T LAST AND YOU DIDN'T HAVE MONEY TO GET MORE.: PATIENT DECLINED

## 2022-04-18 SDOH — ECONOMIC STABILITY: HOUSING INSECURITY
IN THE LAST 12 MONTHS, WAS THERE A TIME WHEN YOU DID NOT HAVE A STEADY PLACE TO SLEEP OR SLEPT IN A SHELTER (INCLUDING NOW)?: PATIENT REFUSED

## 2022-04-18 SDOH — ECONOMIC STABILITY: HOUSING INSECURITY: IN THE LAST 12 MONTHS, HOW MANY PLACES HAVE YOU LIVED?: 1

## 2022-04-18 SDOH — HEALTH STABILITY: PHYSICAL HEALTH: ON AVERAGE, HOW MANY MINUTES DO YOU ENGAGE IN EXERCISE AT THIS LEVEL?: 60 MIN

## 2022-04-18 SDOH — HEALTH STABILITY: PHYSICAL HEALTH: ON AVERAGE, HOW MANY DAYS PER WEEK DO YOU ENGAGE IN MODERATE TO STRENUOUS EXERCISE (LIKE A BRISK WALK)?: 6 DAYS

## 2022-04-18 SDOH — ECONOMIC STABILITY: TRANSPORTATION INSECURITY
IN THE PAST 12 MONTHS, HAS THE LACK OF TRANSPORTATION KEPT YOU FROM MEDICAL APPOINTMENTS OR FROM GETTING MEDICATIONS?: PATIENT DECLINED

## 2022-04-18 SDOH — ECONOMIC STABILITY: HOUSING INSECURITY
IN THE LAST 12 MONTHS, WAS THERE A TIME WHEN YOU DID NOT HAVE A STEADY PLACE TO SLEEP OR SLEPT IN A SHELTER (INCLUDING NOW)?: NO

## 2022-04-18 SDOH — ECONOMIC STABILITY: TRANSPORTATION INSECURITY
IN THE PAST 12 MONTHS, HAS LACK OF TRANSPORTATION KEPT YOU FROM MEETINGS, WORK, OR FROM GETTING THINGS NEEDED FOR DAILY LIVING?: PATIENT DECLINED

## 2022-04-18 SDOH — ECONOMIC STABILITY: FOOD INSECURITY: WITHIN THE PAST 12 MONTHS, YOU WORRIED THAT YOUR FOOD WOULD RUN OUT BEFORE YOU GOT MONEY TO BUY MORE.: NEVER TRUE

## 2022-04-18 SDOH — ECONOMIC STABILITY: TRANSPORTATION INSECURITY
IN THE PAST 12 MONTHS, HAS LACK OF RELIABLE TRANSPORTATION KEPT YOU FROM MEDICAL APPOINTMENTS, MEETINGS, WORK OR FROM GETTING THINGS NEEDED FOR DAILY LIVING?: PATIENT DECLINED

## 2022-04-18 SDOH — HEALTH STABILITY: MENTAL HEALTH
STRESS IS WHEN SOMEONE FEELS TENSE, NERVOUS, ANXIOUS, OR CAN'T SLEEP AT NIGHT BECAUSE THEIR MIND IS TROUBLED. HOW STRESSED ARE YOU?: NOT AT ALL

## 2022-04-18 SDOH — ECONOMIC STABILITY: INCOME INSECURITY: HOW HARD IS IT FOR YOU TO PAY FOR THE VERY BASICS LIKE FOOD, HOUSING, MEDICAL CARE, AND HEATING?: NOT HARD AT ALL

## 2022-04-18 ASSESSMENT — SOCIAL DETERMINANTS OF HEALTH (SDOH)
DO YOU BELONG TO ANY CLUBS OR ORGANIZATIONS SUCH AS CHURCH GROUPS UNIONS, FRATERNAL OR ATHLETIC GROUPS, OR SCHOOL GROUPS?: PATIENT DECLINED
HOW OFTEN DO YOU HAVE A DRINK CONTAINING ALCOHOL: PATIENT DECLINED
HOW HARD IS IT FOR YOU TO PAY FOR THE VERY BASICS LIKE FOOD, HOUSING, MEDICAL CARE, AND HEATING?: NOT HARD AT ALL
HOW OFTEN DO YOU HAVE SIX OR MORE DRINKS ON ONE OCCASION: LESS THAN MONTHLY
HOW MANY DRINKS CONTAINING ALCOHOL DO YOU HAVE ON A TYPICAL DAY WHEN YOU ARE DRINKING: 1 OR 2
WITHIN THE PAST 12 MONTHS, YOU WORRIED THAT YOUR FOOD WOULD RUN OUT BEFORE YOU GOT THE MONEY TO BUY MORE: NEVER TRUE
DO YOU BELONG TO ANY CLUBS OR ORGANIZATIONS SUCH AS CHURCH GROUPS UNIONS, FRATERNAL OR ATHLETIC GROUPS, OR SCHOOL GROUPS?: PATIENT DECLINED

## 2022-04-18 ASSESSMENT — LIFESTYLE VARIABLES
HOW OFTEN DO YOU HAVE A DRINK CONTAINING ALCOHOL: PATIENT DECLINED
HOW MANY STANDARD DRINKS CONTAINING ALCOHOL DO YOU HAVE ON A TYPICAL DAY: 1 OR 2
HOW OFTEN DO YOU HAVE SIX OR MORE DRINKS ON ONE OCCASION: LESS THAN MONTHLY

## 2022-04-18 ASSESSMENT — FIBROSIS 4 INDEX: FIB4 SCORE: 2.32

## 2022-04-18 NOTE — PROGRESS NOTES
Annual Health Assessment Questions:    1.  Are you currently engaging in any exercise or physical activity? Yes    2.  How would you describe your mood or emotional well-being today? good    3.  Have you had any falls in the last year? No    4.  Have you noticed any problems with your balance or had difficulty walking? No    5.  In the last six months have you experienced any leakage of urine? No    6. DPA/Advanced Directive: Patient has Living Will on file.

## 2022-04-18 NOTE — PROGRESS NOTES
"Established Patient    Chief Complaint   Patient presents with   • Other     Finger stick        Subjective:     HPI:   Bert presents today with the following.    Patient Active Problem List    Diagnosis Date Noted   • Vitamin D deficiency 04/18/2022   • Marijuana smoker 03/30/2022   • Prediabetes 03/30/2022   • Trigger ring finger of right hand 03/30/2022   • Chronic alcoholism (HCC) 05/07/2019   • Obesity (BMI 30-39.9) 05/07/2019   • Throat cancer (HCC) 02/13/2019   • Hoarseness of voice 02/13/2019   • History of colonic diverticulitis 02/13/2019   • Gout 01/30/2019   • Essential hypertension 01/30/2019   • Other hyperlipidemia 01/30/2019   • Tubular adenoma of colon 11/17/2015       Current Outpatient Medications on File Prior to Visit   Medication Sig Dispense Refill   • azithromycin (ZITHROMAX) 250 MG Tab Take 2 tablets on day 1.  Take 1 tablet on day 2 through 5. 6 Tablet 0   • DILTIAZem CD (CARDIZEM CD) 180 MG CAPSULE SR 24 HR Take 1 Capsule by mouth every day. 90 Capsule 3   • lisinopril (PRINIVIL) 40 MG tablet Take 1.5 Tablets by mouth every day. 150 Tablet 3   • atorvastatin (LIPITOR) 40 MG Tab Take 1 Tablet by mouth every day. 100 Tablet 3   • allopurinol (ZYLOPRIM) 300 MG Tab TAKE ONE TABLET BY MOUTH DAILY 90 Tablet 3     No current facility-administered medications on file prior to visit.       Allergies, past medical history, past surgical history, family history, social history reviewed and updated    ROS:  All other systems reviewed and are negative except as stated in the HPI       Physical Exam:     /70 (BP Location: Left arm, Patient Position: Sitting, BP Cuff Size: Adult)   Pulse 85   Temp 36.7 °C (98.1 °F) (Temporal)   Ht 1.778 m (5' 10\")   Wt 91.2 kg (201 lb)   SpO2 97%   BMI 28.84 kg/m²   General: Normal appearing. No distress.  Pulmonary: Clear to ausculation.  Normal effort.   Cardiovascular: Regular rate and rhythm    Assessment and Plan:     67 y.o. male with the following " issues.    1. Essential hypertension  Patient checking blood pressure at home with arm wrist, systolic blood pressure number is in 130s to 150s, his blood pressure possibly are not accurate, advised to obtain arm cuff and bring to the office to compare with our machine, reported compliance with lisinopril 40 mg daily, diltiazem 180 mg daily, denied having related symptoms    -discussion in details on target blood pressure goal, advised monitoring BP closely at home.  Have BP log to present at follow-up visit or send through my chart.   -Advised low-salt diet, healthy dietary option include plenty of vegetable, reduce refine carbohydrates and sugar, regular exercise as tolerated, healthy fat/protein/carbs, also avoid alcohol, no NSAIDs  If symptoms worsen or persist patient can return to clinic for reevaluation.  Red flags and strict emergency room precautions discussed.  Discussed side effects of medication. Patient understand      3. Prediabetes  4. Chronic alcoholism (HCC)  5. Vitamin D deficiency  Patient also had lab with prediabetes range of A1c, vitamin D deficiency, patient continue drinking alcohol like 2 beers a day, educated in detail regarding alcohol habit and complication especially in the light of history of throat cancer   - Advised to take vitamin D supplement as well and healthy lifestyle and food for prediabetes      2. Trigger ring finger of right hand  PROCEDURE NOTE:    trigger finger injection of right ring finger    Indications for Procedure:  Trigger finger and pain    Procedure:  The proposed procedure was explained to the patient. Risks, side effects, benefits, and alternatives were discussed.  Discussed risks, including infection, bleeding, possibility of arthritis flare, increased blood sugars, unlikely allergic reaction.  All questions were answered to the patient's satisfaction, who gave verbal and written informed consent.     The patient was seated on the exam table. Landmarks were  located on the right hand and positioning marked. The area was cleansed x 3 with Betadine swabs. 20 mg of Kenalog (1/2 ml) was easily injected through a 1-in 25-gauge needle into the right ring finger A1 pulley with 1/2 ml of 1% lidocaine to make 1 ml total volume. After wiping off the Betadine with an alcohol swab, the injection site was covered with a Band-Aid. There were no immediate complications. The patient was able to bend the injected finger without difficulty after the procedure.    The patient was instructed to report any fever, redness, or bleeding. Recheck p.r.n.      Follow Up:      Return in about 4 weeks (around 5/16/2022) for Hypertension management.    Please note that this dictation was created using voice recognition software. I have made every reasonable attempt to correct obvious errors, but I expect that there are errors of grammar and possibly content that I did not discover before finalizing the note.    Signed by: Jeaneth Eric M.D.

## 2022-04-26 RX ORDER — TRIAMCINOLONE ACETONIDE 40 MG/ML
40 INJECTION, SUSPENSION INTRA-ARTICULAR; INTRAMUSCULAR ONCE
Status: CANCELLED | OUTPATIENT
Start: 2022-04-26 | End: 2022-04-26

## 2022-05-06 ENCOUNTER — TELEPHONE (OUTPATIENT)
Dept: HEALTH INFORMATION MANAGEMENT | Facility: OTHER | Age: 67
End: 2022-05-06

## 2023-03-27 DIAGNOSIS — M10.9 GOUT OF FOOT, UNSPECIFIED CAUSE, UNSPECIFIED CHRONICITY, UNSPECIFIED LATERALITY: ICD-10-CM

## 2023-03-27 RX ORDER — ALLOPURINOL 300 MG/1
TABLET ORAL
Qty: 90 TABLET | Refills: 3 | Status: SHIPPED | OUTPATIENT
Start: 2023-03-27

## 2023-07-17 ENCOUNTER — DOCUMENTATION (OUTPATIENT)
Dept: HEALTH INFORMATION MANAGEMENT | Facility: OTHER | Age: 68
End: 2023-07-17

## 2023-09-15 ENCOUNTER — TELEPHONE (OUTPATIENT)
Dept: HEALTH INFORMATION MANAGEMENT | Facility: OTHER | Age: 68
End: 2023-09-15